# Patient Record
Sex: MALE | Race: WHITE | NOT HISPANIC OR LATINO | Employment: FULL TIME | URBAN - METROPOLITAN AREA
[De-identification: names, ages, dates, MRNs, and addresses within clinical notes are randomized per-mention and may not be internally consistent; named-entity substitution may affect disease eponyms.]

---

## 2021-10-01 ENCOUNTER — APPOINTMENT (OUTPATIENT)
Dept: CT IMAGING | Facility: HOSPITAL | Age: 26
End: 2021-10-01
Attending: EMERGENCY MEDICINE
Payer: COMMERCIAL

## 2021-10-01 ENCOUNTER — HOSPITAL ENCOUNTER (EMERGENCY)
Facility: HOSPITAL | Age: 26
Discharge: HOME | End: 2021-10-01
Attending: EMERGENCY MEDICINE | Admitting: EMERGENCY MEDICINE
Payer: COMMERCIAL

## 2021-10-01 VITALS
HEIGHT: 72 IN | TEMPERATURE: 99.5 F | SYSTOLIC BLOOD PRESSURE: 128 MMHG | RESPIRATION RATE: 16 BRPM | OXYGEN SATURATION: 97 % | WEIGHT: 160 LBS | BODY MASS INDEX: 21.67 KG/M2 | HEART RATE: 77 BPM | DIASTOLIC BLOOD PRESSURE: 76 MMHG

## 2021-10-01 DIAGNOSIS — R10.13 ABDOMINAL PAIN, EPIGASTRIC: ICD-10-CM

## 2021-10-01 DIAGNOSIS — E87.6 HYPOKALEMIA: ICD-10-CM

## 2021-10-01 LAB
ALBUMIN SERPL-MCNC: 4.1 G/DL (ref 3.5–5)
ALP SERPL-CCNC: 59 U/L (ref 45–120)
ALT SERPL W P-5'-P-CCNC: 42 U/L (ref 0–45)
ANION GAP SERPL CALCULATED.3IONS-SCNC: 12 MMOL/L (ref 5–18)
AST SERPL W P-5'-P-CCNC: 83 U/L (ref 0–40)
BASOPHILS # BLD AUTO: 0.1 10E3/UL (ref 0–0.2)
BASOPHILS NFR BLD AUTO: 2 %
BILIRUB DIRECT SERPL-MCNC: 0.4 MG/DL
BILIRUB SERPL-MCNC: 1.2 MG/DL (ref 0–1)
BUN SERPL-MCNC: 6 MG/DL (ref 8–22)
CALCIUM SERPL-MCNC: 9.9 MG/DL (ref 8.5–10.5)
CHLORIDE BLD-SCNC: 98 MMOL/L (ref 98–107)
CO2 SERPL-SCNC: 28 MMOL/L (ref 22–31)
CREAT SERPL-MCNC: 0.71 MG/DL (ref 0.7–1.3)
EOSINOPHIL # BLD AUTO: 0.2 10E3/UL (ref 0–0.7)
EOSINOPHIL NFR BLD AUTO: 4 %
ERYTHROCYTE [DISTWIDTH] IN BLOOD BY AUTOMATED COUNT: 13.3 % (ref 10–15)
ETHANOL SERPL-MCNC: <10 MG/DL
GFR SERPL CREATININE-BSD FRML MDRD: >90 ML/MIN/1.73M2
GLUCOSE BLD-MCNC: 95 MG/DL (ref 70–125)
HCT VFR BLD AUTO: 43.1 % (ref 40–53)
HGB BLD-MCNC: 15 G/DL (ref 13.3–17.7)
HOLD SPECIMEN: NORMAL
IMM GRANULOCYTES # BLD: 0 10E3/UL
IMM GRANULOCYTES NFR BLD: 0 %
LIPASE SERPL-CCNC: 14 U/L (ref 0–52)
LYMPHOCYTES # BLD AUTO: 1.9 10E3/UL (ref 0.8–5.3)
LYMPHOCYTES NFR BLD AUTO: 33 %
MCH RBC QN AUTO: 31.4 PG (ref 26.5–33)
MCHC RBC AUTO-ENTMCNC: 34.8 G/DL (ref 31.5–36.5)
MCV RBC AUTO: 90 FL (ref 78–100)
MONOCYTES # BLD AUTO: 0.7 10E3/UL (ref 0–1.3)
MONOCYTES NFR BLD AUTO: 11 %
NEUTROPHILS # BLD AUTO: 2.9 10E3/UL (ref 1.6–8.3)
NEUTROPHILS NFR BLD AUTO: 50 %
NRBC # BLD AUTO: 0 10E3/UL
NRBC BLD AUTO-RTO: 0 /100
PLATELET # BLD AUTO: 307 10E3/UL (ref 150–450)
POTASSIUM BLD-SCNC: 3.1 MMOL/L (ref 3.5–5)
PROT SERPL-MCNC: 7.5 G/DL (ref 6–8)
RBC # BLD AUTO: 4.77 10E6/UL (ref 4.4–5.9)
SODIUM SERPL-SCNC: 138 MMOL/L (ref 136–145)
WBC # BLD AUTO: 5.8 10E3/UL (ref 4–11)

## 2021-10-01 PROCEDURE — 96361 HYDRATE IV INFUSION ADD-ON: CPT

## 2021-10-01 PROCEDURE — 99285 EMERGENCY DEPT VISIT HI MDM: CPT | Mod: 25

## 2021-10-01 PROCEDURE — 80053 COMPREHEN METABOLIC PANEL: CPT | Performed by: EMERGENCY MEDICINE

## 2021-10-01 PROCEDURE — 83690 ASSAY OF LIPASE: CPT | Performed by: EMERGENCY MEDICINE

## 2021-10-01 PROCEDURE — 82077 ASSAY SPEC XCP UR&BREATH IA: CPT | Performed by: EMERGENCY MEDICINE

## 2021-10-01 PROCEDURE — 250N000011 HC RX IP 250 OP 636: Performed by: EMERGENCY MEDICINE

## 2021-10-01 PROCEDURE — 96374 THER/PROPH/DIAG INJ IV PUSH: CPT | Mod: 59

## 2021-10-01 PROCEDURE — 82248 BILIRUBIN DIRECT: CPT | Performed by: EMERGENCY MEDICINE

## 2021-10-01 PROCEDURE — 258N000003 HC RX IP 258 OP 636: Performed by: EMERGENCY MEDICINE

## 2021-10-01 PROCEDURE — 74177 CT ABD & PELVIS W/CONTRAST: CPT

## 2021-10-01 PROCEDURE — 250N000009 HC RX 250: Performed by: EMERGENCY MEDICINE

## 2021-10-01 PROCEDURE — 85025 COMPLETE CBC W/AUTO DIFF WBC: CPT | Performed by: EMERGENCY MEDICINE

## 2021-10-01 PROCEDURE — 36415 COLL VENOUS BLD VENIPUNCTURE: CPT | Performed by: EMERGENCY MEDICINE

## 2021-10-01 PROCEDURE — 82310 ASSAY OF CALCIUM: CPT | Performed by: EMERGENCY MEDICINE

## 2021-10-01 PROCEDURE — 250N000013 HC RX MED GY IP 250 OP 250 PS 637: Performed by: EMERGENCY MEDICINE

## 2021-10-01 RX ORDER — ONDANSETRON 4 MG/1
4 TABLET, ORALLY DISINTEGRATING ORAL ONCE
Status: COMPLETED | OUTPATIENT
Start: 2021-10-01 | End: 2021-10-01

## 2021-10-01 RX ORDER — IOPAMIDOL 755 MG/ML
100 INJECTION, SOLUTION INTRAVASCULAR ONCE
Status: COMPLETED | OUTPATIENT
Start: 2021-10-01 | End: 2021-10-01

## 2021-10-01 RX ORDER — POTASSIUM CHLORIDE 1500 MG/1
40 TABLET, EXTENDED RELEASE ORAL ONCE
Status: COMPLETED | OUTPATIENT
Start: 2021-10-01 | End: 2021-10-01

## 2021-10-01 RX ADMIN — IOPAMIDOL 100 ML: 755 INJECTION, SOLUTION INTRAVENOUS at 12:22

## 2021-10-01 RX ADMIN — ONDANSETRON 4 MG: 4 TABLET, ORALLY DISINTEGRATING ORAL at 08:45

## 2021-10-01 RX ADMIN — HYDROMORPHONE HYDROCHLORIDE 0.5 MG: 1 INJECTION, SOLUTION INTRAMUSCULAR; INTRAVENOUS; SUBCUTANEOUS at 10:39

## 2021-10-01 RX ADMIN — LIDOCAINE HYDROCHLORIDE 30 ML: 20 SOLUTION ORAL; TOPICAL at 10:39

## 2021-10-01 RX ADMIN — SODIUM CHLORIDE 1000 ML: 9 INJECTION, SOLUTION INTRAVENOUS at 10:38

## 2021-10-01 RX ADMIN — POTASSIUM CHLORIDE 40 MEQ: 20 TABLET, EXTENDED RELEASE ORAL at 13:24

## 2021-10-01 ASSESSMENT — ENCOUNTER SYMPTOMS
ABDOMINAL PAIN: 1
COUGH: 0
FEVER: 0
TROUBLE SWALLOWING: 0
NAUSEA: 0
DYSURIA: 0
SHORTNESS OF BREATH: 0
VOMITING: 0
DIARRHEA: 0

## 2021-10-01 ASSESSMENT — MIFFLIN-ST. JEOR: SCORE: 1748.76

## 2021-10-01 NOTE — ED NOTES
Abdominal pain that is sharp and constant; radiates to back. Has been interfering with sleep, activity and diet. Stools less frequent due to decreased appetite. Bowel sound hypoactive in all quadrants. Pp strong. History of recent endoscopy, patient states he just finished a course of antibiotics, no history of previous surgeries. State's he has eduarda hospitalized recently for this same problem out of state.

## 2021-10-01 NOTE — ED TRIAGE NOTES
Patient had pancreatitis a month ago.  Last night he came back from [place].  The pain started at dinner time last night.  Abdominal pain, similar to pancreatitis as before. Nausea without vomiting.

## 2021-10-01 NOTE — ED PROVIDER NOTES
EMERGENCY DEPARTMENT ENCOUNTER      NAME: Javier Rivas  AGE: 26 year old male  YOB: 1995  MRN: [id number]  EVALUATION DATE & TIME: 10/1/2021 10:01 AM    PCP: [facility]    ED PROVIDER: Pavithra Blunt M.D.        Chief Complaint   Patient presents with     Abdominal Pain         FINAL IMPRESSION:    1. Hypokalemia    2. Abdominal pain, epigastric            MEDICAL DECISION MAKIN year old male with reported recent admission for pancreatitis who presents emergency department with abdominal pain and concerns for recurrent pancreatitis.  Laboratories and CT scan unremarkable no signs of pancreatitis.  No other signs for abdominal pain.  Does not appear toxic or septic.  At this time I feel he can be discharged home.  Slight possibilities could be a gastritis and therefore encourage clear liquid diet at this time.  He agrees with this plan.  Understands what to watch for and when to return to the ER and all of his questions have been answered.      ED COURSE:  10:17 AM I met with the patient to gather history and perform my exam. ED course and treatment discussed.    1:07 PM Discussed with the patient and all questioned fully answered. He will call me if any problems arise.  No signs or pancreatitis on laboratory or CT imaging.  Overall things look good.  Could represent a very mild gastritis as well.  We will have him eat a clear liquid diet try to stay away from acidic foods for a couple of days.  Does not appear toxic.  Overall abdomen very soft and benign.  Patient feels comfortable with this plan.  Understands what to watch for and when to return to the ER.  All of his questions have been answered.    I do not think that this represents ACS, PE, ruptured AAA, aortic dissection, bowel obstruction, bowel ischemia, cholecystitis, pancreatitis, appendicitis, diverticulitis, kidney stone, pyelonephritis, incarcerated or strangulated hernia, testicular torsion, viscus  perforation, perforated GI ulcer, or other such etiologies at this time.    COVID-19 PPE worn during patient evaluation:  Mask: n95 and homemade masks   Eye Protection: goggles   Gown: none  Hair cover: yes  Face shield: none  Patient wearing a mask: yes    At the conclusion of the encounter I discussed the results of all of the tests and the disposition. Their questions were answered. The patient (and any family present) acknowledged understanding and were agreeable with the care plan.          CONSULTANTS:  none        MEDICATIONS GIVEN IN THE EMERGENCY:  Medications   ondansetron (ZOFRAN-ODT) ODT tab 4 mg (4 mg Oral Given 10/1/21 0845)   0.9% sodium chloride BOLUS (0 mLs Intravenous Stopped 10/1/21 1214)   HYDROmorphone (DILAUDID) injection 0.5 mg (0.5 mg Intravenous Given 10/1/21 1039)   lidocaine (XYLOCAINE) 2 % 15 mL, alum & mag hydroxide-simethicone (MAALOX) 15 mL GI Cocktail (30 mLs Oral Given 10/1/21 1039)   iopamidol (ISOVUE-370) solution 100 mL (100 mLs Intravenous Given 10/1/21 1222)   potassium chloride ER (KLOR-CON M) CR tablet 40 mEq (40 mEq Oral Given 10/1/21 1324)           NEW PRESCRIPTIONS STARTED AT TODAY'S ER VISIT     Medication List      There are no discharge medications for this visit.             CONDITION:  stable        DISPOSITION:  discharge home         =================================================================  =================================================================    HPI    Patient information was obtained from: Patient    Use of Intrepreter: N/A     Javier Rivas is a 26 year old male with history of depression who presents to the ER via private car with complaints of abdominal pain.    Patient presents with increasing epigastric and left upper quadrant abdominal pain.  He reports recent hospitalization while in [place] for pancreatitis.  Denies alcohol use or drug use.  States this feels like the pancreatitis.  Otherwise denies any fevers, cough, chest pain,  shortness of breath, vomiting, diarrhea, dysuria, hematuria.      REVIEW OF SYSTEMS  Review of Systems   Constitutional: Negative for fever.   HENT: Negative for trouble swallowing.    Respiratory: Negative for cough and shortness of breath.    Cardiovascular: Negative for chest pain.   Gastrointestinal: Positive for abdominal pain. Negative for diarrhea, nausea and vomiting.   Genitourinary: Negative for dysuria.   Allergic/Immunologic: Negative for immunocompromised state.   All other systems reviewed and are negative.            PAST MEDICAL HISTORY:  Past Medical History:   Diagnosis Date     Allergic state      Anxiety      Depressive disorder          PAST SURGICAL HISTORY:  No past surgical history on file.      CURRENT MEDICATIONS:    Prior to Admission medications    Medication Sig Start Date End Date Taking? Authorizing Provider   divalproex (DEPAKOTE ER) 250 MG 24 hr tablet Take 3 tablets (750 mg) by mouth At Bedtime 10/23/15 11/22/15  Rosendo Arriola MD   fexofenadine-pseudoePHEDrine (ALLEGRA-D 24) 180-240 MG per tablet Take 1 tablet by mouth daily    Unknown, Entered By History   mometasone (NASONEX) 50 MCG/ACT nasal spray Spray 2 sprays into both nostrils daily    Reported, Patient   QUEtiapine (SEROQUEL) 25 MG tablet Take 1-2 tablets (25-50 mg) by mouth every 6 hours as needed (agitation) 10/23/15   Rosendo Arriola MD         ALLERGIES:  No Known Allergies      FAMILY HISTORY:  No family history on file.      SOCIAL HISTORY:  Social History     Socioeconomic History     Marital status: Single     Spouse name: Not on file     Number of children: Not on file     Years of education: Not on file     Highest education level: Not on file   Occupational History     Not on file   Tobacco Use     Smoking status: Never Smoker     Smokeless tobacco: Current User   Substance and Sexual Activity     Alcohol use: Yes     Comment: occ.     Drug use: Yes     Types: Marijuana     Sexual activity: Not on file    Other Topics Concern     Not on file   Social History Narrative     Not on file     Social Determinants of Health     Financial Resource Strain:      Difficulty of Paying Living Expenses:    Food Insecurity:      Worried About Running Out of Food in the Last Year:      Ran Out of Food in the Last Year:    Transportation Needs:      Lack of Transportation (Medical):      Lack of Transportation (Non-Medical):    Physical Activity:      Days of Exercise per Week:      Minutes of Exercise per Session:    Stress:      Feeling of Stress :    Social Connections:      Frequency of Communication with Friends and Family:      Frequency of Social Gatherings with Friends and Family:      Attends Sabianist Services:      Active Member of Clubs or Organizations:      Attends Club or Organization Meetings:      Marital Status:    Intimate Partner Violence:      Fear of Current or Ex-Partner:      Emotionally Abused:      Physically Abused:      Sexually Abused:          VITALS:  Patient Vitals for the past 24 hrs:   BP Temp Temp src Pulse Resp SpO2 Height Weight   10/01/21 1212 130/68 -- -- 63 17 97 % -- --   10/01/21 1011 (!) 151/74 -- -- 62 17 -- -- --   10/01/21 0724 (!) 153/92 99.5  F (37.5  C) Oral 77 18 99 % 1.829 m (6') 72.6 kg (160 lb)       Wt Readings from Last 3 Encounters:   10/01/21 72.6 kg (160 lb)   10/19/15 68.5 kg (151 lb) (44 %, Z= -0.14)*     * Growth percentiles are based on [facility] (Boys, 2-20 Years) data.         PHYSICAL EXAM    Constitutional:  Well developed, Well nourished, NAD, GCS 15  HENT:  Normocephalic, Atraumatic, Bilateral external ears normal, Nose normal. Neck- Normal range of motion, No tenderness, Supple, No stridor.  Eyes:  PERRL, EOMI, Conjunctiva normal, No discharge.  Respiratory:  Normal breath sounds, No respiratory distress, No wheezing, Speaks full sentences easily. No cough.   Cardiovascular:  Normal heart rate, Regular rhythm, No murmurs, No rubs, No gallops.   GI:  No excessive  obesity.  Bowel sounds normal, Soft, mild epigastric and LUQ tenderness, No masses, No flank tenderness. No rebound or guarding.   : deferred  Musculoskeletal: No cyanosis, No clubbing. Good range of motion in all major joints. No major deformities noted.   Integument:  Warm, Dry, No erythema, No rash.  No petechiae.   Neurologic:  Alert & oriented x 3, No focal deficits noted.   Psychiatric:  Affect normal, Cooperative          LAB:  All pertinent labs reviewed and interpreted.  Recent Results (from the past 24 hour(s))   Basic metabolic panel    Collection Time: 10/01/21  8:39 AM   Result Value Ref Range    Sodium 138 136 - 145 mmol/L    Potassium 3.1 (L) 3.5 - 5.0 mmol/L    Chloride 98 98 - 107 mmol/L    Carbon Dioxide (CO2) 28 22 - 31 mmol/L    Anion Gap 12 5 - 18 mmol/L    Urea Nitrogen 6 (L) 8 - 22 mg/dL    Creatinine 0.71 0.70 - 1.30 mg/dL    Calcium 9.9 8.5 - 10.5 mg/dL    Glucose 95 70 - 125 mg/dL    GFR Estimate >90 >60 mL/min/1.73m2   Hepatic function panel    Collection Time: 10/01/21  8:39 AM   Result Value Ref Range    Bilirubin Total 1.2 (H) 0.0 - 1.0 mg/dL    Bilirubin Direct 0.4 <=0.5 mg/dL    Protein Total 7.5 6.0 - 8.0 g/dL    Albumin 4.1 3.5 - 5.0 g/dL    Alkaline Phosphatase 59 45 - 120 U/L    AST 83 (H) 0 - 40 U/L    ALT 42 0 - 45 U/L   Lipase    Collection Time: 10/01/21  8:39 AM   Result Value Ref Range    Lipase 14 0 - 52 U/L   Ethyl Alcohol Level    Collection Time: 10/01/21  8:39 AM   Result Value Ref Range    Alcohol, Blood <10 None detected mg/dL   CBC with platelets and differential    Collection Time: 10/01/21  8:39 AM   Result Value Ref Range    WBC Count 5.8 4.0 - 11.0 10e3/uL    RBC Count 4.77 4.40 - 5.90 10e6/uL    Hemoglobin 15.0 13.3 - 17.7 g/dL    Hematocrit 43.1 40.0 - 53.0 %    MCV 90 78 - 100 fL    MCH 31.4 26.5 - 33.0 pg    MCHC 34.8 31.5 - 36.5 g/dL    RDW 13.3 10.0 - 15.0 %    Platelet Count 307 150 - 450 10e3/uL    % Neutrophils 50 %    % Lymphocytes 33 %    % Monocytes  11 %    % Eosinophils 4 %    % Basophils 2 %    % Immature Granulocytes 0 %    NRBCs per 100 WBC 0 <1 /100    Absolute Neutrophils 2.9 1.6 - 8.3 10e3/uL    Absolute Lymphocytes 1.9 0.8 - 5.3 10e3/uL    Absolute Monocytes 0.7 0.0 - 1.3 10e3/uL    Absolute Eosinophils 0.2 0.0 - 0.7 10e3/uL    Absolute Basophils 0.1 0.0 - 0.2 10e3/uL    Absolute Immature Granulocytes 0.0 <=0.0 10e3/uL    Absolute NRBCs 0.0 10e3/uL   Extra Blue Top Tube    Collection Time: 10/01/21  8:39 AM   Result Value Ref Range    Hold Specimen JIC        No results found for: ABORH        RADIOLOGY:  Reviewed all pertinent imaging. Please see official radiology report.    CT Abdomen Pelvis w Contrast   Final Result   IMPRESSION:    1.  Hepatic steatosis.   2.  Nonobstructing right renal calculus.             EKG:    none      PROCEDURES:  none      I, Marta Chou, am serving as a scribe to document services personally performed by Dr. Pavithra Blunt based on my observation and the provider's statements to me. I, Dr. Pavithra Blunt MD attest that Marta Chou is acting in a scribe capacity, has observed my performance of the services and has documented them in accordance with my direction.        Pavithra Blunt M.D. [facility]  Emergency Medicine and Medical Toxicology  Formerly [facility] EMERGENCY DEPARTMENT  [address] [place] [id number] [phone]  Dept: [phone]           Pavithra Blunt MD  10/01/21 6541

## 2021-10-01 NOTE — DISCHARGE INSTRUCTIONS
No signs of pancreatitis on either your blood work or the CT scan.  Overall things look very good.  I recommend no doing a clear liquid diet for the next couple of days to see if this helps.  Clear liquid diet recommends things like fruit juice, but I would avoid fruit juice at this time is to help limit acidic foods as well is there is a possibility discussed represent some gastritis or stomach irritation.    Make an appointment to see family doctor for next week if your symptoms are not improving.    Return to emergency department with worse pain, black or bloody stools, fever, or any other concerns.    Thank you for choosing [facility].  It has been my pleasure caring for you today.     ~Dr. Merlene MD

## 2022-01-21 ENCOUNTER — HOSPITAL ENCOUNTER (INPATIENT)
Facility: HOSPITAL | Age: 27
LOS: 6 days | Discharge: HOME | DRG: 439 | End: 2022-01-28
Attending: EMERGENCY MEDICINE | Admitting: STUDENT IN AN ORGANIZED HEALTH CARE EDUCATION/TRAINING PROGRAM
Payer: MEDICAID

## 2022-01-21 ENCOUNTER — APPOINTMENT (OUTPATIENT)
Dept: CT IMAGING | Facility: HOSPITAL | Age: 27
DRG: 439 | End: 2022-01-21
Payer: MEDICAID

## 2022-01-21 DIAGNOSIS — K85.90 ACUTE PANCREATITIS: Primary | ICD-10-CM

## 2022-01-21 DIAGNOSIS — F10.220 ALCOHOL DEPENDENCE WITH UNCOMPLICATED INTOXICATION (H): ICD-10-CM

## 2022-01-21 DIAGNOSIS — K29.80 DUODENITIS: ICD-10-CM

## 2022-01-21 DIAGNOSIS — F10.930 ALCOHOL WITHDRAWAL SYNDROME WITHOUT COMPLICATION (H): ICD-10-CM

## 2022-01-21 DIAGNOSIS — K85.20 ALCOHOL-INDUCED ACUTE PANCREATITIS WITHOUT INFECTION OR NECROSIS: ICD-10-CM

## 2022-01-21 DIAGNOSIS — R10.12 LUQ ABDOMINAL PAIN: ICD-10-CM

## 2022-01-21 DIAGNOSIS — R11.0 NAUSEA: ICD-10-CM

## 2022-01-21 LAB
ALBUMIN SERPL-MCNC: 4.6 G/DL (ref 3.5–5)
ALBUMIN UR-MCNC: 10 MG/DL
ALP SERPL-CCNC: 58 U/L (ref 45–120)
ALT SERPL W P-5'-P-CCNC: 39 U/L (ref 0–45)
ANION GAP SERPL CALCULATED.3IONS-SCNC: 15 MMOL/L (ref 5–18)
APPEARANCE UR: CLEAR
AST SERPL W P-5'-P-CCNC: 39 U/L (ref 0–40)
BASOPHILS # BLD AUTO: 0.1 10E3/UL (ref 0–0.2)
BASOPHILS NFR BLD AUTO: 1 %
BILIRUB DIRECT SERPL-MCNC: 0.1 MG/DL
BILIRUB SERPL-MCNC: 0.4 MG/DL (ref 0–1)
BILIRUB UR QL STRIP: NEGATIVE
BUN SERPL-MCNC: 11 MG/DL (ref 8–22)
CALCIUM SERPL-MCNC: 9.2 MG/DL (ref 8.5–10.5)
CHLORIDE BLD-SCNC: 104 MMOL/L (ref 98–107)
CO2 SERPL-SCNC: 25 MMOL/L (ref 22–31)
COLOR UR AUTO: ABNORMAL
CREAT SERPL-MCNC: 0.8 MG/DL (ref 0.7–1.3)
EOSINOPHIL # BLD AUTO: 0 10E3/UL (ref 0–0.7)
EOSINOPHIL NFR BLD AUTO: 1 %
ERYTHROCYTE [DISTWIDTH] IN BLOOD BY AUTOMATED COUNT: 13.4 % (ref 10–15)
ETHANOL SERPL-MCNC: 363 MG/DL
GFR SERPL CREATININE-BSD FRML MDRD: >90 ML/MIN/1.73M2
GLUCOSE BLD-MCNC: 94 MG/DL (ref 70–125)
GLUCOSE UR STRIP-MCNC: NEGATIVE MG/DL
HCT VFR BLD AUTO: 47.3 % (ref 40–53)
HGB BLD-MCNC: 15.9 G/DL (ref 13.3–17.7)
HGB UR QL STRIP: NEGATIVE
IMM GRANULOCYTES # BLD: 0 10E3/UL
IMM GRANULOCYTES NFR BLD: 0 %
KETONES UR STRIP-MCNC: NEGATIVE MG/DL
LEUKOCYTE ESTERASE UR QL STRIP: NEGATIVE
LIPASE SERPL-CCNC: 70 U/L (ref 0–52)
LYMPHOCYTES # BLD AUTO: 2.5 10E3/UL (ref 0.8–5.3)
LYMPHOCYTES NFR BLD AUTO: 37 %
MCH RBC QN AUTO: 30.1 PG (ref 26.5–33)
MCHC RBC AUTO-ENTMCNC: 33.6 G/DL (ref 31.5–36.5)
MCV RBC AUTO: 90 FL (ref 78–100)
MONOCYTES # BLD AUTO: 0.6 10E3/UL (ref 0–1.3)
MONOCYTES NFR BLD AUTO: 9 %
MUCOUS THREADS #/AREA URNS LPF: PRESENT /LPF
NEUTROPHILS # BLD AUTO: 3.6 10E3/UL (ref 1.6–8.3)
NEUTROPHILS NFR BLD AUTO: 52 %
NITRATE UR QL: NEGATIVE
NRBC # BLD AUTO: 0 10E3/UL
NRBC BLD AUTO-RTO: 0 /100
PH UR STRIP: 6 [PH] (ref 5–7)
PLATELET # BLD AUTO: 289 10E3/UL (ref 150–450)
POTASSIUM BLD-SCNC: 3.9 MMOL/L (ref 3.5–5)
PROT SERPL-MCNC: 8.2 G/DL (ref 6–8)
RBC # BLD AUTO: 5.28 10E6/UL (ref 4.4–5.9)
RBC URINE: 0 /HPF
SARS-COV-2 RNA RESP QL NAA+PROBE: NEGATIVE
SODIUM SERPL-SCNC: 144 MMOL/L (ref 136–145)
SP GR UR STRIP: 1.02 (ref 1–1.03)
TROPONIN I SERPL-MCNC: <0.01 NG/ML (ref 0–0.29)
UROBILINOGEN UR STRIP-MCNC: <2 MG/DL
WBC # BLD AUTO: 6.8 10E3/UL (ref 4–11)
WBC URINE: 0 /HPF

## 2022-01-21 PROCEDURE — 82248 BILIRUBIN DIRECT: CPT | Performed by: EMERGENCY MEDICINE

## 2022-01-21 PROCEDURE — 96375 TX/PRO/DX INJ NEW DRUG ADDON: CPT

## 2022-01-21 PROCEDURE — 250N000011 HC RX IP 250 OP 636: Performed by: PHYSICIAN ASSISTANT

## 2022-01-21 PROCEDURE — 83735 ASSAY OF MAGNESIUM: CPT | Performed by: PHYSICIAN ASSISTANT

## 2022-01-21 PROCEDURE — 99285 EMERGENCY DEPT VISIT HI MDM: CPT | Mod: 25

## 2022-01-21 PROCEDURE — 36415 COLL VENOUS BLD VENIPUNCTURE: CPT | Performed by: PHYSICIAN ASSISTANT

## 2022-01-21 PROCEDURE — 87635 SARS-COV-2 COVID-19 AMP PRB: CPT | Performed by: PHYSICIAN ASSISTANT

## 2022-01-21 PROCEDURE — 81001 URINALYSIS AUTO W/SCOPE: CPT | Performed by: PHYSICIAN ASSISTANT

## 2022-01-21 PROCEDURE — 74177 CT ABD & PELVIS W/CONTRAST: CPT

## 2022-01-21 PROCEDURE — C9113 INJ PANTOPRAZOLE SODIUM, VIA: HCPCS | Performed by: PHYSICIAN ASSISTANT

## 2022-01-21 PROCEDURE — C9803 HOPD COVID-19 SPEC COLLECT: HCPCS

## 2022-01-21 PROCEDURE — 82077 ASSAY SPEC XCP UR&BREATH IA: CPT | Performed by: PHYSICIAN ASSISTANT

## 2022-01-21 PROCEDURE — 96361 HYDRATE IV INFUSION ADD-ON: CPT

## 2022-01-21 PROCEDURE — 84484 ASSAY OF TROPONIN QUANT: CPT | Performed by: PHYSICIAN ASSISTANT

## 2022-01-21 PROCEDURE — 82248 BILIRUBIN DIRECT: CPT | Performed by: STUDENT IN AN ORGANIZED HEALTH CARE EDUCATION/TRAINING PROGRAM

## 2022-01-21 PROCEDURE — 258N000003 HC RX IP 258 OP 636: Performed by: PHYSICIAN ASSISTANT

## 2022-01-21 PROCEDURE — 85025 COMPLETE CBC W/AUTO DIFF WBC: CPT | Performed by: EMERGENCY MEDICINE

## 2022-01-21 PROCEDURE — 84100 ASSAY OF PHOSPHORUS: CPT | Performed by: PHYSICIAN ASSISTANT

## 2022-01-21 PROCEDURE — 83690 ASSAY OF LIPASE: CPT | Performed by: STUDENT IN AN ORGANIZED HEALTH CARE EDUCATION/TRAINING PROGRAM

## 2022-01-21 PROCEDURE — 80053 COMPREHEN METABOLIC PANEL: CPT | Performed by: EMERGENCY MEDICINE

## 2022-01-21 PROCEDURE — 85025 COMPLETE CBC W/AUTO DIFF WBC: CPT | Performed by: STUDENT IN AN ORGANIZED HEALTH CARE EDUCATION/TRAINING PROGRAM

## 2022-01-21 PROCEDURE — 83690 ASSAY OF LIPASE: CPT | Performed by: EMERGENCY MEDICINE

## 2022-01-21 PROCEDURE — 93005 ELECTROCARDIOGRAM TRACING: CPT | Performed by: PHYSICIAN ASSISTANT

## 2022-01-21 PROCEDURE — 80053 COMPREHEN METABOLIC PANEL: CPT | Performed by: STUDENT IN AN ORGANIZED HEALTH CARE EDUCATION/TRAINING PROGRAM

## 2022-01-21 RX ORDER — IOPAMIDOL 755 MG/ML
100 INJECTION, SOLUTION INTRAVASCULAR ONCE
Status: COMPLETED | OUTPATIENT
Start: 2022-01-21 | End: 2022-01-21

## 2022-01-21 RX ORDER — HYDROMORPHONE HCL IN WATER/PF 6 MG/30 ML
0.5 PATIENT CONTROLLED ANALGESIA SYRINGE INTRAVENOUS ONCE
Status: COMPLETED | OUTPATIENT
Start: 2022-01-21 | End: 2022-01-21

## 2022-01-21 RX ORDER — ONDANSETRON 2 MG/ML
4 INJECTION INTRAMUSCULAR; INTRAVENOUS ONCE
Status: COMPLETED | OUTPATIENT
Start: 2022-01-21 | End: 2022-01-21

## 2022-01-21 RX ORDER — HYDROMORPHONE HCL IN WATER/PF 6 MG/30 ML
0.5 PATIENT CONTROLLED ANALGESIA SYRINGE INTRAVENOUS
Status: COMPLETED | OUTPATIENT
Start: 2022-01-21 | End: 2022-01-22

## 2022-01-21 RX ADMIN — HYDROMORPHONE HYDROCHLORIDE 0.5 MG: 0.2 INJECTION, SOLUTION INTRAMUSCULAR; INTRAVENOUS; SUBCUTANEOUS at 22:15

## 2022-01-21 RX ADMIN — ONDANSETRON 4 MG: 2 INJECTION INTRAMUSCULAR; INTRAVENOUS at 22:13

## 2022-01-21 RX ADMIN — IOPAMIDOL 100 ML: 755 INJECTION, SOLUTION INTRAVENOUS at 22:46

## 2022-01-21 RX ADMIN — PANTOPRAZOLE SODIUM 40 MG: 40 INJECTION, POWDER, FOR SOLUTION INTRAVENOUS at 22:19

## 2022-01-21 RX ADMIN — SODIUM CHLORIDE 1000 ML: 9 INJECTION, SOLUTION INTRAVENOUS at 22:11

## 2022-01-21 ASSESSMENT — ENCOUNTER SYMPTOMS
CHILLS: 0
BACK PAIN: 1
BLOOD IN STOOL: 0
ABDOMINAL PAIN: 1
FEVER: 0

## 2022-01-21 ASSESSMENT — MIFFLIN-ST. JEOR: SCORE: 1748.76

## 2022-01-22 PROBLEM — K85.90 ACUTE PANCREATITIS: Status: ACTIVE | Noted: 2022-01-22

## 2022-01-22 PROBLEM — F10.220 ALCOHOL DEPENDENCE WITH UNCOMPLICATED INTOXICATION (H): Status: ACTIVE | Noted: 2022-01-22

## 2022-01-22 PROBLEM — R10.12 LUQ ABDOMINAL PAIN: Status: ACTIVE | Noted: 2022-01-22

## 2022-01-22 PROBLEM — R11.0 NAUSEA: Status: ACTIVE | Noted: 2022-01-22

## 2022-01-22 PROBLEM — K85.90 PANCREATITIS: Status: ACTIVE | Noted: 2022-01-22

## 2022-01-22 PROBLEM — K29.80 DUODENITIS: Status: ACTIVE | Noted: 2022-01-22

## 2022-01-22 LAB
ATRIAL RATE - MUSE: 107 BPM
DIASTOLIC BLOOD PRESSURE - MUSE: NORMAL MMHG
INTERPRETATION ECG - MUSE: NORMAL
MAGNESIUM SERPL-MCNC: 1.9 MG/DL (ref 1.8–2.6)
P AXIS - MUSE: 89 DEGREES
PHOSPHATE SERPL-MCNC: 3.3 MG/DL (ref 2.5–4.5)
PR INTERVAL - MUSE: 140 MS
QRS DURATION - MUSE: 88 MS
QT - MUSE: 332 MS
QTC - MUSE: 443 MS
R AXIS - MUSE: 67 DEGREES
SYSTOLIC BLOOD PRESSURE - MUSE: NORMAL MMHG
T AXIS - MUSE: 78 DEGREES
VENTRICULAR RATE- MUSE: 107 BPM

## 2022-01-22 PROCEDURE — 96376 TX/PRO/DX INJ SAME DRUG ADON: CPT

## 2022-01-22 PROCEDURE — 258N000003 HC RX IP 258 OP 636: Performed by: HOSPITALIST

## 2022-01-22 PROCEDURE — 120N000001 HC R&B MED SURG/OB

## 2022-01-22 PROCEDURE — 250N000009 HC RX 250: Performed by: PHYSICIAN ASSISTANT

## 2022-01-22 PROCEDURE — 250N000011 HC RX IP 250 OP 636: Performed by: PHYSICIAN ASSISTANT

## 2022-01-22 PROCEDURE — C9113 INJ PANTOPRAZOLE SODIUM, VIA: HCPCS | Performed by: STUDENT IN AN ORGANIZED HEALTH CARE EDUCATION/TRAINING PROGRAM

## 2022-01-22 PROCEDURE — G0378 HOSPITAL OBSERVATION PER HR: HCPCS

## 2022-01-22 PROCEDURE — C9113 INJ PANTOPRAZOLE SODIUM, VIA: HCPCS | Performed by: PHYSICIAN ASSISTANT

## 2022-01-22 PROCEDURE — HZ2ZZZZ DETOXIFICATION SERVICES FOR SUBSTANCE ABUSE TREATMENT: ICD-10-PCS | Performed by: STUDENT IN AN ORGANIZED HEALTH CARE EDUCATION/TRAINING PROGRAM

## 2022-01-22 PROCEDURE — 96366 THER/PROPH/DIAG IV INF ADDON: CPT

## 2022-01-22 PROCEDURE — 250N000011 HC RX IP 250 OP 636: Performed by: STUDENT IN AN ORGANIZED HEALTH CARE EDUCATION/TRAINING PROGRAM

## 2022-01-22 PROCEDURE — 258N000003 HC RX IP 258 OP 636: Performed by: PHYSICIAN ASSISTANT

## 2022-01-22 PROCEDURE — 99207 PR APP CREDIT; MD BILLING SHARED VISIT: CPT | Performed by: HOSPITALIST

## 2022-01-22 PROCEDURE — 258N000003 HC RX IP 258 OP 636: Performed by: EMERGENCY MEDICINE

## 2022-01-22 PROCEDURE — 250N000013 HC RX MED GY IP 250 OP 250 PS 637: Performed by: STUDENT IN AN ORGANIZED HEALTH CARE EDUCATION/TRAINING PROGRAM

## 2022-01-22 PROCEDURE — 96361 HYDRATE IV INFUSION ADD-ON: CPT

## 2022-01-22 PROCEDURE — 96365 THER/PROPH/DIAG IV INF INIT: CPT | Mod: 59

## 2022-01-22 PROCEDURE — 250N000011 HC RX IP 250 OP 636: Performed by: INTERNAL MEDICINE

## 2022-01-22 PROCEDURE — 250N000011 HC RX IP 250 OP 636: Performed by: HOSPITALIST

## 2022-01-22 PROCEDURE — 99219 PR INITIAL OBSERVATION CARE,LEVEL II: CPT | Performed by: STUDENT IN AN ORGANIZED HEALTH CARE EDUCATION/TRAINING PROGRAM

## 2022-01-22 PROCEDURE — 258N000003 HC RX IP 258 OP 636: Performed by: STUDENT IN AN ORGANIZED HEALTH CARE EDUCATION/TRAINING PROGRAM

## 2022-01-22 PROCEDURE — 250N000013 HC RX MED GY IP 250 OP 250 PS 637: Performed by: PHYSICIAN ASSISTANT

## 2022-01-22 RX ORDER — HALOPERIDOL 5 MG/ML
2.5-5 INJECTION INTRAMUSCULAR EVERY 6 HOURS PRN
Status: DISCONTINUED | OUTPATIENT
Start: 2022-01-22 | End: 2022-01-28 | Stop reason: HOSPADM

## 2022-01-22 RX ORDER — ONDANSETRON 4 MG/1
4 TABLET, ORALLY DISINTEGRATING ORAL EVERY 8 HOURS PRN
COMMUNITY
Start: 2021-09-21

## 2022-01-22 RX ORDER — TRAZODONE HYDROCHLORIDE 50 MG/1
50 TABLET, FILM COATED ORAL
COMMUNITY
Start: 2021-11-30

## 2022-01-22 RX ORDER — NALOXONE HYDROCHLORIDE 0.4 MG/ML
0.2 INJECTION, SOLUTION INTRAMUSCULAR; INTRAVENOUS; SUBCUTANEOUS
Status: DISCONTINUED | OUTPATIENT
Start: 2022-01-22 | End: 2022-01-28 | Stop reason: HOSPADM

## 2022-01-22 RX ORDER — FLUMAZENIL 0.1 MG/ML
0.2 INJECTION, SOLUTION INTRAVENOUS
Status: DISCONTINUED | OUTPATIENT
Start: 2022-01-22 | End: 2022-01-28 | Stop reason: HOSPADM

## 2022-01-22 RX ORDER — HYDROXYZINE HYDROCHLORIDE 25 MG/1
25 TABLET, FILM COATED ORAL 4 TIMES DAILY PRN
COMMUNITY
Start: 2021-11-30

## 2022-01-22 RX ORDER — CLONIDINE HYDROCHLORIDE 0.1 MG/1
0.1 TABLET ORAL 2 TIMES DAILY PRN
COMMUNITY
Start: 2021-11-30

## 2022-01-22 RX ORDER — CLONIDINE HYDROCHLORIDE 0.1 MG/1
0.1 TABLET ORAL EVERY 8 HOURS
Status: DISCONTINUED | OUTPATIENT
Start: 2022-01-22 | End: 2022-01-28 | Stop reason: HOSPADM

## 2022-01-22 RX ORDER — ACETAMINOPHEN 325 MG/1
650 TABLET ORAL EVERY 6 HOURS PRN
Status: DISCONTINUED | OUTPATIENT
Start: 2022-01-22 | End: 2022-01-28 | Stop reason: HOSPADM

## 2022-01-22 RX ORDER — DIAZEPAM 10 MG
10 TABLET ORAL EVERY 30 MIN PRN
Status: DISCONTINUED | OUTPATIENT
Start: 2022-01-22 | End: 2022-01-25

## 2022-01-22 RX ORDER — HYDROMORPHONE HCL IN WATER/PF 6 MG/30 ML
0.5 PATIENT CONTROLLED ANALGESIA SYRINGE INTRAVENOUS EVERY 4 HOURS PRN
Status: DISCONTINUED | OUTPATIENT
Start: 2022-01-22 | End: 2022-01-22

## 2022-01-22 RX ORDER — HYDROMORPHONE HCL IN WATER/PF 6 MG/30 ML
0.5 PATIENT CONTROLLED ANALGESIA SYRINGE INTRAVENOUS
Status: DISCONTINUED | OUTPATIENT
Start: 2022-01-22 | End: 2022-01-23

## 2022-01-22 RX ORDER — ONDANSETRON 4 MG/1
4 TABLET, ORALLY DISINTEGRATING ORAL EVERY 6 HOURS PRN
Status: DISCONTINUED | OUTPATIENT
Start: 2022-01-22 | End: 2022-01-27

## 2022-01-22 RX ORDER — OLANZAPINE 5 MG/1
5-10 TABLET, ORALLY DISINTEGRATING ORAL EVERY 6 HOURS PRN
Status: DISCONTINUED | OUTPATIENT
Start: 2022-01-22 | End: 2022-01-28 | Stop reason: HOSPADM

## 2022-01-22 RX ORDER — NICOTINE 21 MG/24HR
1 PATCH, TRANSDERMAL 24 HOURS TRANSDERMAL DAILY
Status: DISCONTINUED | OUTPATIENT
Start: 2022-01-22 | End: 2022-01-28 | Stop reason: HOSPADM

## 2022-01-22 RX ORDER — SODIUM CHLORIDE, SODIUM LACTATE, POTASSIUM CHLORIDE, CALCIUM CHLORIDE 600; 310; 30; 20 MG/100ML; MG/100ML; MG/100ML; MG/100ML
INJECTION, SOLUTION INTRAVENOUS ONCE
Status: DISCONTINUED | OUTPATIENT
Start: 2022-01-22 | End: 2022-01-22

## 2022-01-22 RX ORDER — ONDANSETRON 2 MG/ML
4 INJECTION INTRAMUSCULAR; INTRAVENOUS EVERY 6 HOURS PRN
Status: DISCONTINUED | OUTPATIENT
Start: 2022-01-22 | End: 2022-01-27

## 2022-01-22 RX ORDER — NALOXONE HYDROCHLORIDE 0.4 MG/ML
0.4 INJECTION, SOLUTION INTRAMUSCULAR; INTRAVENOUS; SUBCUTANEOUS
Status: DISCONTINUED | OUTPATIENT
Start: 2022-01-22 | End: 2022-01-28 | Stop reason: HOSPADM

## 2022-01-22 RX ORDER — DIAZEPAM 10 MG/2ML
5-10 INJECTION, SOLUTION INTRAMUSCULAR; INTRAVENOUS EVERY 30 MIN PRN
Status: DISCONTINUED | OUTPATIENT
Start: 2022-01-22 | End: 2022-01-25

## 2022-01-22 RX ORDER — PANTOPRAZOLE SODIUM 40 MG/1
40 TABLET, DELAYED RELEASE ORAL DAILY
COMMUNITY
Start: 2021-11-30

## 2022-01-22 RX ORDER — SODIUM CHLORIDE 9 MG/ML
INJECTION, SOLUTION INTRAVENOUS CONTINUOUS
Status: DISCONTINUED | OUTPATIENT
Start: 2022-01-22 | End: 2022-01-25

## 2022-01-22 RX ORDER — MULTIPLE VITAMINS W/ MINERALS TAB 9MG-400MCG
1 TAB ORAL DAILY
Status: DISCONTINUED | OUTPATIENT
Start: 2022-01-23 | End: 2022-01-28 | Stop reason: HOSPADM

## 2022-01-22 RX ORDER — ACETAMINOPHEN 650 MG/1
650 SUPPOSITORY RECTAL EVERY 6 HOURS PRN
Status: DISCONTINUED | OUTPATIENT
Start: 2022-01-22 | End: 2022-01-28 | Stop reason: HOSPADM

## 2022-01-22 RX ORDER — FOLIC ACID 1 MG/1
1 TABLET ORAL DAILY
Status: DISCONTINUED | OUTPATIENT
Start: 2022-01-23 | End: 2022-01-28 | Stop reason: HOSPADM

## 2022-01-22 RX ORDER — ZOLPIDEM TARTRATE 10 MG/1
10 TABLET ORAL
COMMUNITY
Start: 2021-12-29

## 2022-01-22 RX ADMIN — THIAMINE HYDROCHLORIDE: 100 INJECTION, SOLUTION INTRAMUSCULAR; INTRAVENOUS at 01:34

## 2022-01-22 RX ADMIN — CLONIDINE HYDROCHLORIDE 0.1 MG: 0.1 TABLET ORAL at 17:11

## 2022-01-22 RX ADMIN — DIAZEPAM 10 MG: 10 TABLET ORAL at 06:15

## 2022-01-22 RX ADMIN — HYDROMORPHONE HYDROCHLORIDE 0.5 MG: 0.2 INJECTION, SOLUTION INTRAMUSCULAR; INTRAVENOUS; SUBCUTANEOUS at 22:21

## 2022-01-22 RX ADMIN — DIAZEPAM 10 MG: 10 TABLET ORAL at 17:11

## 2022-01-22 RX ADMIN — HYDROMORPHONE HYDROCHLORIDE 0.5 MG: 0.2 INJECTION, SOLUTION INTRAMUSCULAR; INTRAVENOUS; SUBCUTANEOUS at 08:45

## 2022-01-22 RX ADMIN — HYDROMORPHONE HYDROCHLORIDE 1 MG: 1 INJECTION, SOLUTION INTRAMUSCULAR; INTRAVENOUS; SUBCUTANEOUS at 16:17

## 2022-01-22 RX ADMIN — PANTOPRAZOLE SODIUM 40 MG: 40 INJECTION, POWDER, FOR SOLUTION INTRAVENOUS at 00:11

## 2022-01-22 RX ADMIN — DIAZEPAM 10 MG: 10 TABLET ORAL at 21:00

## 2022-01-22 RX ADMIN — HYDROMORPHONE HYDROCHLORIDE 1 MG: 1 INJECTION, SOLUTION INTRAMUSCULAR; INTRAVENOUS; SUBCUTANEOUS at 11:59

## 2022-01-22 RX ADMIN — HYDROMORPHONE HYDROCHLORIDE 0.5 MG: 0.2 INJECTION, SOLUTION INTRAMUSCULAR; INTRAVENOUS; SUBCUTANEOUS at 04:14

## 2022-01-22 RX ADMIN — SODIUM CHLORIDE, POTASSIUM CHLORIDE, SODIUM LACTATE AND CALCIUM CHLORIDE 1000 ML: 600; 310; 30; 20 INJECTION, SOLUTION INTRAVENOUS at 02:27

## 2022-01-22 RX ADMIN — PANTOPRAZOLE SODIUM 40 MG: 40 INJECTION, POWDER, FOR SOLUTION INTRAVENOUS at 08:28

## 2022-01-22 RX ADMIN — HYDROMORPHONE HYDROCHLORIDE 0.5 MG: 0.2 INJECTION, SOLUTION INTRAMUSCULAR; INTRAVENOUS; SUBCUTANEOUS at 00:21

## 2022-01-22 RX ADMIN — NICOTINE 1 PATCH: 14 PATCH, EXTENDED RELEASE TRANSDERMAL at 08:48

## 2022-01-22 RX ADMIN — HYDROMORPHONE HYDROCHLORIDE 0.5 MG: 0.2 INJECTION, SOLUTION INTRAMUSCULAR; INTRAVENOUS; SUBCUTANEOUS at 02:22

## 2022-01-22 RX ADMIN — Medication: at 04:18

## 2022-01-22 RX ADMIN — DIAZEPAM 10 MG: 10 TABLET ORAL at 09:41

## 2022-01-22 RX ADMIN — HYDROMORPHONE HYDROCHLORIDE 0.5 MG: 0.2 INJECTION, SOLUTION INTRAMUSCULAR; INTRAVENOUS; SUBCUTANEOUS at 19:43

## 2022-01-22 RX ADMIN — CLONIDINE HYDROCHLORIDE 0.1 MG: 0.1 TABLET ORAL at 08:28

## 2022-01-22 ASSESSMENT — ACTIVITIES OF DAILY LIVING (ADL)
ADLS_ACUITY_SCORE: 12
DEPENDENT_IADLS:: TRANSPORTATION
ADLS_ACUITY_SCORE: 12

## 2022-01-22 ASSESSMENT — MIFFLIN-ST. JEOR: SCORE: 1776.88

## 2022-01-22 NOTE — PROGRESS NOTES
Hospitalist Progress Note    Assessment/Plan    Active Problems:    Acute pancreatitis    Nausea    Pancreatitis    Duodenitis    Alcohol dependence with uncomplicated intoxication (H)    LUQ abdominal pain  26-year-old patient with history of polysubstance abuse (alcohol, cannabis). History of bipolar and panic disorder, presented to the hospital with the left upper quadrant pain after drinking alcohol found to have acute pancreatitis    Abdominal pain due to acute pancreatitis  Admitted to the hospital on January 21, CT abdomen showed edema at the pancreatic head adjacent duodenitis cannot be excluded  Slightly elevated lipase at 70,  Significant amount of pain and requested to increase the frequency of pain medications, denying nausea and vomiting,  -IV fluids, PPI, n.p.o. for now  Had diarrhea yesterday-  Appreciate GI input,    Alcohol intoxication  Alcohol level was 363 on arrival at risk of withdrawal,  CIWA protocol initiated, will monitor      Renal calculus  Incidental finding on CT abdomen nonobstructive right renal calculus,  Follow-up as an outpatient    Tobacco abuse  Nicotine patch ordered    Barriers to Discharge: Monitor for alcohol withdrawal, pancreatitis,    Anticipated discharge date/Disposition: Home in a few days    Subjective  Patient was seen today he was having significant amount of pain and requesting to increase pain medication dose or frequency, denies nausea or vomiting, he states this is not the first time he has pancreatitis but this is the worst pain ever he has    Objective    Vital signs in last 24 hours  Temp:  [98  F (36.7  C)] 98  F (36.7  C)  Pulse:  [] 96  Resp:  [16-22] 20  BP: (122-151)/(58-96) 129/72  SpO2:  [88 %-98 %] 95 % [unfilled] O2 Device: None (Room air)    Weight:   [unfilled] Weight change:     Intake/Output last 3 shifts  No intake/output data recorded.  Body mass index is 21.7 kg/m .    Physical Exam:  General Appearance: Alert, oriented x3, does not  appear in distress.  HEENT: Normocephalic. No scleral icterus, . Mucous membranes moist.  Heart: :Regular rate and rhythm, normal S1 ,S2, No murmurs, no JVD, no pedal edema   Lungs: Clear to auscultation bilaterally. No wheezing or crackles  Abdomen: Soft, left upper quadrant tender, no rebound or rigidity, non distended, bowel sounds present.  Extremity: No deformity. No joint swelling.      Pertinent Labs   Lab Results: personally reviewed.   Recent Labs   Lab 01/21/22 2141      CO2 25   BUN 11   ALBUMIN 4.6   ALKPHOS 58   ALT 39   AST 39     Recent Labs   Lab 01/21/22 2141   WBC 6.8   HGB 15.9   HCT 47.3        Recent Labs   Lab 01/21/22 2141   TROPONINI <0.01     Invalid input(s): POCGLUFGR          Pertinent Radiology   Radiology Results: Reviewed CT results      Advanced Care Planning:  Discharge Planning discussed with patient  Total time with this patient is 25 min with 50% of time spent in examining the patient, reviewing records, discussing plan of care and counseling, 50% of time spent in coordination of care.  Care discussed and coordinated with JO ANN.      Heather López MD  Internal Medicine Hospitalist  1/22/2022

## 2022-01-22 NOTE — ED NOTES
Pt frequently requesting pain meds. Also wanting water, advised he can only have ice chips. Ice chips given.

## 2022-01-22 NOTE — PHARMACY-ADMISSION MEDICATION HISTORY
Pharmacy Note - Admission Medication History    Pertinent Provider Information: N/A     ______________________________________________________________________    Prior To Admission (PTA) med list completed and updated in EMR.       PTA Med List   Medication Sig Last Dose     cloNIDine (CATAPRES) 0.1 MG tablet Take 0.1 mg by mouth 2 times daily as needed Past Week at prn     fexofenadine-pseudoePHEDrine (ALLEGRA-D 24) 180-240 MG per tablet Take 1 tablet by mouth daily as needed  Past Month at prn     hydrOXYzine (ATARAX) 25 MG tablet Take 25 mg by mouth 4 times daily as needed Past Week at prn     ondansetron (ZOFRAN-ODT) 4 MG ODT tab Place 4 mg under the tongue every 8 hours as needed Past Week at prn     pantoprazole (PROTONIX) 40 MG EC tablet Take 40 mg by mouth daily 1/20/2022 at Unknown time     traZODone (DESYREL) 50 MG tablet Take 50 mg by mouth nightly as needed 1/20/2022 at Unknown time     zolpidem (AMBIEN) 10 MG tablet Take 10 mg by mouth nightly as needed Past Week at prn       Information source(s): Patient and CareEverywhere/[facility]  Method of interview communication: in-person    Summary of Changes to PTA Med List  New: ondansetron, pantoprazole, zolpidem, clonidine, hydroxyzine, trazodone  Discontinued: divalproex, mometasone, quetiapine, sucralfate, bupropion, oxycodone  Changed: N/A    Patient was asked about OTC/herbal products specifically.  PTA med list reflects this.    In the past week, patient estimated taking medication this percent of the time:  50-90% due to other.    Allergies were reviewed, assessed, and updated with the patient.      Patient does not use any multi-dose medications prior to admission.    The information provided in this note is only as accurate as the sources available at the time of the update(s).    Thank you for the opportunity to participate in the care of this patient.    Marta Blue  1/22/2022 8:25 AM

## 2022-01-22 NOTE — H&P
[facility]    History and Physical - Hospitalist Service       Date of Admission:  1/21/2022    Assessment & Plan      Javier Rivas is a 26 year old male admitted on 1/21/2022.      26-year-old male with history of polysubstance abuse [alcohol, cannabis, bipolar and panic disorders presented with abdominal pain.  Admitted for acute pancreatitis and alcohol abuse with risk of withdrawal.    Acute pancreatitis  -CT abdomen reveals edema of the pancreatic head-adjacent duodenitis not excluded.  Lipase slightly high at 70.  -N.p.o., IV fluid hydration, pain control, PPI for possible associated duodenitis, GI consult    Alcohol Intoxication with risk of withdrawal  - On presentation Alcohol level high 363  -CIWA protocol, multivitamin folate thiamine    Incidental imaging findings-nonobstructing right renal calculus.  Outpatient follow-up.    Smokeless tobacco use-nicotine patch ordered       Diet: NPO for Medical/Clinical Reasons Except for: Ice Chips, Meds    DVT Prophylaxis: Pneumatic Compression Devices  Becerra Catheter: Not present  Central Lines: None  Cardiac Monitoring: None  Code Status:  Full    Clinically Significant Risk Factors Present on Admission                    Disposition Plan   Expected Discharge:      The patient's care was discussed with the Patient.    Thony Pizarro MD  Hospitalist Service  [facility]  Securely message with the [employer] Web Console (learn more here)  Text page via [employer] Paging/Directory         ______________________________________________________________________    Chief Complaint   Abdominal pain       History is obtained from the patient    History of Present Illness   Javier Rivas  26-year-old male with history of alcohol abuse, bipolar and panic disorders presented with abdominal pain.  Reports started to have pain a day prior  around epigastrium and left upper quadrant region.  Also reports associated nausea but no  vomiting.  Had previous episodes of pancreatitis after which he quit alcohol for few months and started drinking again recently.  He drinks about 750 mL of vodka every day for the last few weeks, reports history of withdrawal seizures in the past, denies any chest pain, shortness of breath, cough, diarrhea, fever or chills.  Upon eval at ED noted to be tachycardic, with unremarkable labs except for mild elevation of lipase,but CT abdomen showing pancreatic edema consistent with pancreatitis.  Started on IV fluids, CIWA protocol, pain control and admitted for further management.    Review of Systems    The 10 point Review of Systems is negative other than noted in the HPI or here.     Past Medical History    I have reviewed this patient's medical history and updated it with pertinent information if needed.   Past Medical History:   Diagnosis Date     Allergic state      Anxiety      Depressive disorder        Past Surgical History   I have reviewed this patient's surgical history and updated it with pertinent information if needed.  History reviewed. No pertinent surgical history.    Social History   I have reviewed this patient's social history and updated it with pertinent information if needed.  Social History     Tobacco Use     Smoking status: Never Smoker     Smokeless tobacco: Current User   Substance Use Topics     Alcohol use: Yes     Comment: occ.     Drug use: Yes     Types: Marijuana       Family History   Family history noncontributory to current complaint    Prior to Admission Medications   Prior to Admission Medications   Prescriptions Last Dose Informant Patient Reported? Taking?   QUEtiapine (SEROQUEL) 25 MG tablet   No No   Sig: Take 1-2 tablets (25-50 mg) by mouth every 6 hours as needed (agitation)   divalproex (DEPAKOTE ER) 250 MG 24 hr tablet   No No   Sig: Take 3 tablets (750 mg) by mouth At Bedtime   fexofenadine-pseudoePHEDrine (ALLEGRA-D 24) 180-240 MG per tablet   Yes No   Sig: Take 1  tablet by mouth daily   mometasone (NASONEX) 50 MCG/ACT nasal spray   Yes No   Sig: Spray 2 sprays into both nostrils daily      Facility-Administered Medications: None     Allergies   No Known Allergies    Physical Exam   Vital Signs: Temp: 98  F (36.7  C) Temp src: Oral BP: 126/71 Pulse: (!) 124   Resp: 16 SpO2: 92 % O2 Device: None (Room air)    Weight: 160 lbs 0 oz    General Appearance: Alert oriented  Eyes: Pink conjunctiva, NIS  HEENT: PERRLA  EOMI  Respiratory: Clear chest bilaterally  Cardiovascular: S1-S2, tachycardic, no murmur heard  GI: Soft nontender abdomen  Genitourinary: No SP tenderness  Skin: No skin rashes  Musculoskeletal: No edema  Neurologic: AOx3      Data   Data reviewed today: I reviewed all medications, new labs and imaging results over the last 24 hours. I personally reviewed           Recent Labs   Lab 01/21/22  2141   WBC 6.8   HGB 15.9   MCV 90         POTASSIUM 3.9   CHLORIDE 104   CO2 25   BUN 11   CR 0.80   ANIONGAP 15   SOUTH 9.2   GLC 94   ALBUMIN 4.6   PROTTOTAL 8.2*   BILITOTAL 0.4   ALKPHOS 58   ALT 39   AST 39   LIPASE 70*     Recent Results (from the past 24 hour(s))   CT Abdomen Pelvis w Contrast    Narrative    EXAM: CT ABDOMEN PELVIS W CONTRAST  LOCATION: [facility]  DATE/TIME: 1/21/2022 10:35 PM    INDICATION: Left lower quadrant pain.  COMPARISON: 12/30/2021  TECHNIQUE: CT scan of the abdomen and pelvis was performed following injection of IV contrast. Multiplanar reformats were obtained. Dose reduction techniques were used.  CONTRAST: isovue 370 100ml    FINDINGS:   LOWER CHEST: Lung bases clear.    HEPATOBILIARY: Hepatic steatosis.    PANCREAS: Edema of the pancreatic head wall thickening of adjacent duodenum not excluded.    SPLEEN: Normal.    ADRENAL GLANDS: Normal.    KIDNEYS/BLADDER: Subcentimeter renal hypodensities small for characterization. Nonobstructing right renal calculus.    BOWEL: Underdistention of the colon reduces  evaluation for wall thickening/colitis. The appendix is normal. Bowel is normal caliber.    LYMPH NODES: Normal.    VASCULATURE: Unremarkable.    PELVIC ORGANS: Prostate calcification.    MUSCULOSKELETAL: Normal.      Impression    IMPRESSION:   1.  Edema of the pancreatic head and wall thickening of the adjacent duodenum not excluded. Correlate clinically for pancreatitis versus duodenitis.  2.  Colon is underdistended reducing evaluation for wall thickening/early changes of colitis.  3.  Nonobstructing right renal calculus.  4.  Fatty liver.  5.  Normal appendix.

## 2022-01-22 NOTE — CONSULTS
CONSULTING PHYSICIAN   Thony Pizarro MD     REASON FOR CONSULTATION   abdominal pain, pancreatitis      CHIEF COMPLAINT   Javier Rivas came to the hospital for evaluation of abdominal pain      HISTORY OF PRESENT ILLNESS   Javier Rivas is a 26 year old male with hx of pancreatitis admitted with abdominal pain    Patient reports a hx of ETOH abuse, last drink day of admission (), hx of pancreatitis admitted with abdominal pain    Patient notes onset of abdominal pain yesterday with radiation to his back. Mid abd in location. Similar to previous episodes of pancreatitis    Patient reports 8-10 episodes of pancreatitis in the past.     ETOH abuse hx. Does not smoke but does vape multiple times daily. Uses marijuana.     CT with pancreatitis without fluid collection   Lipase 70    In between episodes, he denies chronic abd pain. Denies steatorrhea. No hx of elevated blood glucose.     PAST HISTORY   Past Medical History:   Diagnosis Date     Allergic state      Anxiety      Depressive disorder       History reviewed. No pertinent surgical history.     Family History Social History   History reviewed. No pertinent family history. Social History     Tobacco Use     Smoking status: Never Smoker     Smokeless tobacco: Current User   Substance Use Topics     Alcohol use: Yes     Comment: occ.     Drug use: Yes     Types: Marijuana          MEDICATIONS & ALLERGIES   (Not in a hospital admission)       ALLERGIES   No Known Allergies      REVIEW OF SYSTEMS   A comprehensive review of systems was performed and was otherwise noncontributory.     OBJECTIVE   Vitals Blood pressure 129/72, pulse 96, temperature 98  F (36.7  C), temperature source Oral, resp. rate 20, height 1.829 m (6'), weight 72.6 kg (160 lb), SpO2 95 %.           Physical  Exam   GENERAL: alert and oriented, well nourished in no apparent distress    SKIN: warm and dry, no rashes    HEENT: atraumatic, anicteric,  moist mucous membranes, neck soft/supple     PULMONARY: normal resp effort, breath sounds clear to auscultation bilateral    CARDIOVASCULAR: normal rate and rhythm, no murmurs, no edema    ABDOMEN: tenderness in mid abd no distention, bowel sounds normal    MUSCULOSKELETAL: joints and gait normal    NEUROLOGICAL: appropriate mental status, grossly intact  DERM: no rash, no jaundice    PSYCHIATRIC: normal mood, affect and insight        LABORATORY    ELECTROLYTE PANEL   Recent Labs   Lab 01/21/22 2141      POTASSIUM 3.9   CHLORIDE 104   CO2 25   GLC 94   CR 0.80   BUN 11      HEMATOLOGY PANEL   Recent Labs   Lab 01/21/22 2141   HGB 15.9   MCV 90   WBC 6.8         LIVER AND PANCREAS PANEL   Recent Labs   Lab 01/21/22 2141   AST 39   ALT 39   ALKPHOS 58   BILITOTAL 0.4   LIPASE 70*     IMAGING STUDIES    EXAM: CT ABDOMEN PELVIS W CONTRAST  LOCATION: [facility]  DATE/TIME: 1/21/2022 10:35 PM     INDICATION: Left lower quadrant pain.  COMPARISON: 12/30/2021  TECHNIQUE: CT scan of the abdomen and pelvis was performed following injection of IV contrast. Multiplanar reformats were obtained. Dose reduction techniques were used.  CONTRAST: isovue 370 100ml     FINDINGS:   LOWER CHEST: Lung bases clear.     HEPATOBILIARY: Hepatic steatosis.     PANCREAS: Edema of the pancreatic head wall thickening of adjacent duodenum not excluded.     SPLEEN: Normal.     ADRENAL GLANDS: Normal.     KIDNEYS/BLADDER: Subcentimeter renal hypodensities small for characterization. Nonobstructing right renal calculus.     BOWEL: Underdistention of the colon reduces evaluation for wall thickening/colitis. The appendix is normal. Bowel is normal caliber.     LYMPH NODES: Normal.     VASCULATURE: Unremarkable.     PELVIC ORGANS: Prostate calcification.     MUSCULOSKELETAL: Normal.                                                                      IMPRESSION:   1.  Edema of the pancreatic head and wall  thickening of the adjacent duodenum not excluded. Correlate clinically for pancreatitis versus duodenitis.  2.  Colon is underdistended reducing evaluation for wall thickening/early changes of colitis.  3.  Nonobstructing right renal calculus.  4.  Fatty liver.  5.  Normal appendix.EXAM: CT ABDOMEN PELVIS W CONTRAST  LOCATION: [facility]  DATE/TIME: 1/21/2022 10:35 PM     INDICATION: Left lower quadrant pain.  COMPARISON: 12/30/2021  TECHNIQUE: CT scan of the abdomen and pelvis was performed following injection of IV contrast. Multiplanar reformats were obtained. Dose reduction techniques were used.  CONTRAST: isovue 370 100ml     FINDINGS:   LOWER CHEST: Lung bases clear.     HEPATOBILIARY: Hepatic steatosis.     PANCREAS: Edema of the pancreatic head wall thickening of adjacent duodenum not excluded.     SPLEEN: Normal.     ADRENAL GLANDS: Normal.     KIDNEYS/BLADDER: Subcentimeter renal hypodensities small for characterization. Nonobstructing right renal calculus.     BOWEL: Underdistention of the colon reduces evaluation for wall thickening/colitis. The appendix is normal. Bowel is normal caliber.     LYMPH NODES: Normal.     VASCULATURE: Unremarkable.     PELVIC ORGANS: Prostate calcification.     MUSCULOSKELETAL: Normal.                                                                      IMPRESSION:   1.  Edema of the pancreatic head and wall thickening of the adjacent duodenum not excluded. Correlate clinically for pancreatitis versus duodenitis.  2.  Colon is underdistended reducing evaluation for wall thickening/early changes of colitis.  3.  Nonobstructing right renal calculus.  4.  Fatty liver.  5.  Normal appendix.    I have reviewed the current diagnostic and laboratory tests.           IMPRESSION   Javier Rivas is a 26 year old male with recurrent pancreatitis in setting of chronic ETOH and tobacco abuse    Pancreatitis- recurrent. No evidence of calcification on imaging  but at risk for chronic pancreatitis.   Discussed ETOH and tobacco cessation  IVF  Pain control  NPO until off IV narcotics.              Flaco Roman MD  Thank you for the opportunity to participate in the care of this patient.   Please feel free to call me with any questions or concerns.  Phone number [phone].

## 2022-01-22 NOTE — ED NOTES
"[facility] ED Handoff Report    ED Chief Complaint: ETOH abuse, LUQ pain    ED Diagnosis:  (K85.90) Acute pancreatitis  (primary encounter diagnosis)  Comment: Receiving IV fluids and PRN pain medications  Plan: Continue to monitor.  (R10.12) LUQ abdominal pain  Comment:  Plan: Continue to monitor.    (R11.0) Nausea  Comment: Currently denies N/V   Plan: Continue to monitor.  (F10.220) Alcohol dependence with uncomplicated intoxication (H)  Comment:   Plan: Continue to monitor CIWA scale.    (K29.80) Duodenitis  Comment:   Plan: Continue to monitor     PMH:    Past Medical History:   Diagnosis Date     Allergic state      Anxiety      Depressive disorder         Code Status:  Full Code     Falls Risk: Yes Band: Applied    Current Living Situation/Residence: lives in a house     Elimination Status: Continent: Yes     Activity Level: SBA    Patients Preferred Language:  English     Needed: No    Vital Signs:  /77 (BP Location: Left arm)   Pulse 103   Temp 98  F (36.7  C) (Oral)   Resp 20   Ht 1.829 m (6')   Wt 72.6 kg (160 lb)   SpO2 96%   BMI 21.70 kg/m       Cardiac Rhythm: NSR    Pain Score: 7/10    Is the Patient Confused:  No    Last Food or Drink: 1/21/22. Currently NPO except ice chips.    Focused Assessment:  Gastrointestinal/CIWA.    Tests Performed: Done: Labs and Imaging    Treatments Provided: IV fluids, medications    Family Dynamics/Concerns: Yes; please explain: unsure except patient stated he is currently living with his grandparents due to \"what is going on\".    Family Updated On Visitor Policy: No    Plan of Care Communicated to Family: Yes    Who Was Updated about Plan of Care: Patient    Belongings Checklist Done and Signed by Patient: No    Medications sent with patient: nicotine patch.    Covid: asymptomatic , negative    Additional Information:     RN:  1/22/2022 4:30 PM     "

## 2022-01-22 NOTE — ED NOTES
"Pt frequently requesting pain meds while playing on his phone. He states \"I'm in so much pain\".   "

## 2022-01-22 NOTE — ED PROVIDER NOTES
EMERGENCY DEPARTMENT ENCOUNTER      NAME: Javier Rivas  AGE: 26 year old male  YOB: 1995  MRN: [id number]  EVALUATION DATE & TIME: No admission date for patient encounter.    PCP: Alejandro, [facility]    ED PROVIDER: Belinda Kaur PA-C      Chief Complaint   Patient presents with     Abdominal Pain         FINAL IMPRESSION:  1. Acute pancreatitis    2. LUQ abdominal pain    3. Nausea    4. Alcohol dependence with uncomplicated intoxication (H)    5. Duodenitis          ED COURSE & MEDICAL DECISION MAKIN:39 PM I introduced myself to patient, performed initial HPI and examination.   11:30 PM Updated patient on results. Staffed with Dr. Timmons. Paged for hospitalist.  12:17 AM Spoke with hospitalist, Dr. Pizarro, regarding plan for admission. MINDS protocol ordered with anticipation of withdrawal during hospitalization. Does have a history of alcohol withdrawal seizures.      26 year old male with PMH alcohol dependence, epigastric pain presents to the Emergency Department for evaluation of LUQ abdominal pain. Reports pain for approximately 6 hours PTA. Reports nausea without vomiting. No changes in bowel movements. No dysuria. Pain does radiate into back. Does report recent ETOH use, last used 20 minutes PTA. Denies withdrawal symptoms. Does have a history of significant withdrawal with prior seizures.    Differential includes gastritis, PUD, pancreatitis, obstruction, diverticulitis/colitis, and others.     Patient is uncomfortable appearing, nontoxic. Very tender diffusely to abdomen, more notable to LUQ. No distention.   Vital signs: Afebrile, tachycardic. Not hypoxic.     EKG obtained with patient reporting referred pain to chest, ultimately nonischemic. No arrhythmia. Sinus tachycardia. Troponin is negative.  CBC with no leukocytosis, No anemia. No electrolyte derangement, creatinine is normal. UA without evidence of infection, no ketones or evidence of dehydration.   LFT with no  notable abnormalities. Lipase surprisingly only slightly elevated (70). ETOH 363.    COVID testing obtained with anticipated admission, ultimately negative.    CT obtained, edema of pancreatic head and wall thickening of adjacent duodenum not excluded; correlate clinically for pancreatitis vs duodenitis. Colon under-distended. Non-obstructing right renal calculus. Normal appendix.     Clinically, presentation is most consistent with pancreatitis, especially with recent significant ETOH consumption. Surprisingly lipase is not more elevated, could indicate early pancreatitis as patient reports only 6 hours of symptoms.  Could also be duodenitis/early ulcer causing inflammation and pain. Denies dark/bloody stools and no recent hematemesis.     Patient given IV fluids, dilaudid, protonix 80 mg. Patient still remains very uncomfortable, ultimately not appropriate for outpatient management at this time. Plan for admission for further hydration, analgesics, and evaluation/management.     Spoke with hospitalist, Dr. Pizarro, who accepts admission.       MEDICATIONS GIVEN IN THE EMERGENCY:  Medications   HYDROmorphone (DILAUDID) injection 0.5 mg (has no administration in time range)   pantoprazole (PROTONIX) IV push injection 40 mg (has no administration in time range)   0.9% sodium chloride BOLUS (1,000 mLs Intravenous New Bag 1/21/22 2211)   ondansetron (ZOFRAN) injection 4 mg (4 mg Intravenous Given 1/21/22 2213)   HYDROmorphone (DILAUDID) injection 0.5 mg (0.5 mg Intravenous Given 1/21/22 2215)   pantoprazole (PROTONIX) IV push injection 40 mg (40 mg Intravenous Given 1/21/22 2219)   iopamidol (ISOVUE-370) solution 100 mL (100 mLs Intravenous Given 1/21/22 2246)       NEW PRESCRIPTIONS STARTED AT TODAY'S ER VISIT  [unfilled]       =================================================================    HPI    Patient information was obtained from: Patient     Use of : N/A         Javier Rivas is a 26  year old male with a pertinent history of alcohol dependence, alcoholic pancreatitis, hypertension, and hypokalemia who presents to this ED by ambulance for evaluation of abdominal pain.     Per chart review, the patient was seen in [facility] 1/13/2022 for abdominal pain, epigastric. The patient had a scope with no significant findings. The patient said he was told it was most likely to be pancreatitis. Lipase was normal. Chest x-ray and troponin were negative. The patient was discharged and told to continue Protonix, take Acetaminophen as needed for pain, take fluids, and follow up with gastroenterologist.    The patient was seen in the [facility] 1/13/22 (8 days ago). The patient began drinking alcohol immediately after that visit, stating that he has alcohol dependence. He adds that he has withdrawal symptoms of seizures if he does not drink alcohol.    The patient reports that he has been in pain for 6 hours. He states that his last drink was 20 minutes ago and that he is not experiencing any withdrawal symptoms right now. Currently he has some abdominal pain on his left sides that radiate to his back. He believes that this might be pancreatitis as he has had it in the past. No black or bloody stools. No prior stomach surgeries. The patient reports going to rehab 8 times. He is interested in quitting. No fevers or chills. No other symptoms or complaints at this time.    REVIEW OF SYSTEMS   Review of Systems   Constitutional: Negative for chills and fever.   Gastrointestinal: Positive for abdominal pain. Negative for blood in stool.   Musculoskeletal: Positive for back pain.   All other systems reviewed and are negative.    PAST MEDICAL HISTORY:  Past Medical History:   Diagnosis Date     Allergic state      Anxiety      Depressive disorder        PAST SURGICAL HISTORY:  History reviewed. No pertinent surgical history.    CURRENT MEDICATIONS:    divalproex (DEPAKOTE ER) 250 MG 24  hr tablet  fexofenadine-pseudoePHEDrine (ALLEGRA-D 24) 180-240 MG per tablet  mometasone (NASONEX) 50 MCG/ACT nasal spray  QUEtiapine (SEROQUEL) 25 MG tablet        ALLERGIES:  No Known Allergies    FAMILY HISTORY:  History reviewed. No pertinent family history.    SOCIAL HISTORY:   Social History     Socioeconomic History     Marital status: Single     Spouse name: Not on file     Number of children: Not on file     Years of education: Not on file     Highest education level: Not on file   Occupational History     Not on file   Tobacco Use     Smoking status: Never Smoker     Smokeless tobacco: Current User   Substance and Sexual Activity     Alcohol use: Yes     Comment: occ.     Drug use: Yes     Types: Marijuana     Sexual activity: Not on file   Other Topics Concern     Not on file   Social History Narrative     Not on file     Social Determinants of Health     Financial Resource Strain: Not on file   Food Insecurity: Not on file   Transportation Needs: Not on file   Physical Activity: Not on file   Stress: Not on file   Social Connections: Not on file   Intimate Partner Violence: Not on file   Housing Stability: Not on file       VITALS:  /82   Pulse 112   Temp 98  F (36.7  C) (Oral)   Resp 22   Ht 1.829 m (6')   Wt 72.6 kg (160 lb)   SpO2 98%   BMI 21.70 kg/m      PHYSICAL EXAM    Constitutional: Well developed, Well nourished, NAD  HENT: Normocephalic, Atraumatic, Oropharynx clear.   Neck- Supple, Nontender. Normal ROM.   Eyes: Conjunctiva normal  Respiratory: No respiratory distress, speaking in full sentences. Normal breath sounds  Cardiovascular: Tachycardic, Regular rhythm, No murmurs.   GI: Soft, nondistended. Diffuse abdominal tenderness more notable to LUQ. No CVA tenderness.   Musculoskeletal: No deformities, Moves all extremities equally.   Integument: Warm, Dry, No erythema, ecchymosis, or rash.  Neurologic: Alert & oriented x 3, Normal sensory function. No focal deficits.    Psychiatric: Affect normal, Judgment normal, Mood normal. Cooperative.      LAB:  All pertinent labs reviewed and interpreted.  Results for orders placed or performed during the hospital encounter of 01/21/22   CT Abdomen Pelvis w Contrast    Impression    IMPRESSION:   1.  Edema of the pancreatic head and wall thickening of the adjacent duodenum not excluded. Correlate clinically for pancreatitis versus duodenitis.  2.  Colon is underdistended reducing evaluation for wall thickening/early changes of colitis.  3.  Nonobstructing right renal calculus.  4.  Fatty liver.  5.  Normal appendix.   Basic metabolic panel   Result Value Ref Range    Sodium 144 136 - 145 mmol/L    Potassium 3.9 3.5 - 5.0 mmol/L    Chloride 104 98 - 107 mmol/L    Carbon Dioxide (CO2) 25 22 - 31 mmol/L    Anion Gap 15 5 - 18 mmol/L    Urea Nitrogen 11 8 - 22 mg/dL    Creatinine 0.80 0.70 - 1.30 mg/dL    Calcium 9.2 8.5 - 10.5 mg/dL    Glucose 94 70 - 125 mg/dL    GFR Estimate >90 >60 mL/min/1.73m2   Hepatic function panel   Result Value Ref Range    Bilirubin Total 0.4 0.0 - 1.0 mg/dL    Bilirubin Direct 0.1 <=0.5 mg/dL    Protein Total 8.2 (H) 6.0 - 8.0 g/dL    Albumin 4.6 3.5 - 5.0 g/dL    Alkaline Phosphatase 58 45 - 120 U/L    AST 39 0 - 40 U/L    ALT 39 0 - 45 U/L   Result Value Ref Range    Lipase 70 (H) 0 - 52 U/L   CBC with platelets and differential   Result Value Ref Range    WBC Count 6.8 4.0 - 11.0 10e3/uL    RBC Count 5.28 4.40 - 5.90 10e6/uL    Hemoglobin 15.9 13.3 - 17.7 g/dL    Hematocrit 47.3 40.0 - 53.0 %    MCV 90 78 - 100 fL    MCH 30.1 26.5 - 33.0 pg    MCHC 33.6 31.5 - 36.5 g/dL    RDW 13.4 10.0 - 15.0 %    Platelet Count 289 150 - 450 10e3/uL    % Neutrophils 52 %    % Lymphocytes 37 %    % Monocytes 9 %    % Eosinophils 1 %    % Basophils 1 %    % Immature Granulocytes 0 %    NRBCs per 100 WBC 0 <1 /100    Absolute Neutrophils 3.6 1.6 - 8.3 10e3/uL    Absolute Lymphocytes 2.5 0.8 - 5.3 10e3/uL    Absolute Monocytes 0.6  0.0 - 1.3 10e3/uL    Absolute Eosinophils 0.0 0.0 - 0.7 10e3/uL    Absolute Basophils 0.1 0.0 - 0.2 10e3/uL    Absolute Immature Granulocytes 0.0 <=0.4 10e3/uL    Absolute NRBCs 0.0 10e3/uL   UA with Microscopic reflex to Culture    Specimen: Urine, Midstream   Result Value Ref Range    Color Urine Light Yellow Colorless, Straw, Light Yellow, Yellow    Appearance Urine Clear Clear    Glucose Urine Negative Negative mg/dL    Bilirubin Urine Negative Negative    Ketones Urine Negative Negative mg/dL    Specific Gravity Urine 1.017 1.001 - 1.030    Blood Urine Negative Negative    pH Urine 6.0 5.0 - 7.0    Protein Albumin Urine 10  (A) Negative mg/dL    Urobilinogen Urine <2.0 <2.0 mg/dL    Nitrite Urine Negative Negative    Leukocyte Esterase Urine Negative Negative    Mucus Urine Present (A) None Seen /LPF    RBC Urine 0 <=2 /HPF    WBC Urine 0 <=5 /HPF   Troponin I (now)   Result Value Ref Range    Troponin I <0.01 0.00 - 0.29 ng/mL   Alcohol level blood   Result Value Ref Range    Alcohol, Blood 363 (H) None detected mg/dL   Asymptomatic COVID-19 Virus (Coronavirus) by PCR Nasopharyngeal    Specimen: Nasopharyngeal; Swab   Result Value Ref Range    SARS CoV2 PCR Negative Negative       RADIOLOGY:  Reviewed all pertinent imaging. Please see official radiology report.  CT Abdomen Pelvis w Contrast   Final Result   IMPRESSION:    1.  Edema of the pancreatic head and wall thickening of the adjacent duodenum not excluded. Correlate clinically for pancreatitis versus duodenitis.   2.  Colon is underdistended reducing evaluation for wall thickening/early changes of colitis.   3.  Nonobstructing right renal calculus.   4.  Fatty liver.   5.  Normal appendix.          EKG:    Performed at: 21-Jan-2022 22:11:21    Impression: Sinus tachycardia, No STEMI    Rate: 107 BPM  NC Interval: 140 ms  QRS Interval: 88 ms  QTc Interval: 332/443 ms  ST Changes: None  Comparison: None    Dr. Beard and I have independently reviewed and  interpreted the EKG(s) documented above.    PROCEDURES:   None      I, Earl Byrd, am serving as a scribe to document services personally performed by Belinda Kaur PA-C based on my observation and the provider's statements to me. I, Belinda Kaur PA-C, attest that Earl Byrd is acting in a scribe capacity, has observed my performance of the services and has documented them in accordance with my direction.    Belinda Kaur PA-C  Emergency Medicine  [facility] EMERGENCY DEPARTMENT  [address] [place] [id number]  [phone]           Belinda Kaur PA-C  01/22/22 0018

## 2022-01-22 NOTE — CONSULTS
"Care Management Initial Consult    General Information  Assessment completed with: Javier Causey  Type of CM/SW Visit: Initial Assessment    Primary Care Provider verified and updated as needed: Yes   Readmission within the last 30 days: no previous admission in last 30 days      Reason for Consult: discharge planning  Advance Care Planning: Advance Care Planning Reviewed: other (comment) (\"I don't have one\")          Communication Assessment  Patient's communication style: spoken language (English or Bilingual)             Cognitive  Cognitive/Neuro/Behavioral: WDL                      Living Environment:   People in home: grandparent(s)     Current living Arrangements:  (\"town house\")      Able to return to prior arrangements: yes       Family/Social Support:  Care provided by: self  Provides care for: no one, unable/limited ability to care for self     Grandparent(s)          Description of Support System: Supportive,Involved    Support Assessment: Adequate family and caregiver support,Adequate social supports,Patient communicates needs well met    Current Resources:   Patient receiving home care services: No     Community Resources: None  Equipment currently used at home: none  Supplies currently used at home: Other (\"blue light glasses for the computer and I use it a lot\")    Employment/Financial:  Employment Status: employed full-time     Employment/ Comments: \"no  history\"  Financial Concerns: insurance, none (\"no insurance until 2/1/22 when I start [employer] insurance\")   Referral to Financial Counselor: Yes       Lifestyle & Psychosocial Needs:  Social Determinants of Health     Tobacco Use: High Risk     Smoking Tobacco Use: Never Smoker     Smokeless Tobacco Use: Current User   Alcohol Use: Not on file   Financial Resource Strain: Not on file   Food Insecurity: Not on file   Transportation Needs: Not on file   Physical Activity: Not on file   Stress: Not on file   Social Connections: Not on " "file   Intimate Partner Violence: Not on file   Depression: Not on file   Housing Stability: Not on file       Functional Status:  Prior to admission patient needed assistance:   Dependent ADLs:: Independent,Ambulation-no assistive device  Dependent IADLs:: Transportation (\"I don't drive now\")  Assesssment of Functional Status: At functional baseline    Mental Health Status:          Chemical Dependency Status:  Chemical Dependency Status: Current Concern             Values/Beliefs:  Spiritual, Cultural Beliefs, Nondenominational Practices, Values that affect care:                 Additional Information:  Javier lives in a town house with his grandparents. He is independent with ADLs at baseline. He states, \"I work from home now\".    May need CD resources before discharge.    He states, \"I have no insurance right now, and I start with [employer] insurance on 2/1/22.\"    Family to transport at discharge.    Ann Tiwari RN      "

## 2022-01-22 NOTE — ED PROVIDER NOTES
"Emergency Department Midlevel Supervisory Note     I personally saw the patient and performed a substantive portion of the visit including all aspects of the medical decision making.    ED Course:  11:25 PM Belinda Kaur PA-C staffed patient with me. I agree with their assessment and plan of management, and I will see the patient.  11:58 PM I met with the patient to introduce myself, gather additional history, perform my initial exam, update with patient's results and discuss the plan for admission. He was agreeable with the plan.     Brief HPI:     Javier Rivas is a 26 year old male who presents for evaluation of left sided abdominal pain. He was previously evaluated on 1/13/22 at [facility] and dranked alcohol immediately afterwards which he is a known alcoholic. Today, he developed sudden onset of epigastric abdominal pain for the past 6 hours when he was with his grandparents but has not had any alcohol withdrawal symptoms. He says he has not been drinking recently. Prior to arrival, he did take Tylenol with no relief which prompt him to come into ED tonight. No prior ulcers. No prior abdominal surgeries. With his previous alcohol withdrawals, he has had one seizure before and \"gnarly\" symptoms. He states he was hospitalized for prior alcohol withdrawal in [place] where he was unable to recall any memory and had woken up to paramedics.     No fever, chills or any other medical complaints at this time.     I, Renee Byrd, am serving as a scribe to document services personally performed by Mimi Timmons, based on my observations and the provider's statements to me.   I, Mimi Timmons, attest that Renee Byrd was acting in a scribe capacity, has observed my performance of the services and has documented them in accordance with my direction.    Brief Physical Exam:  Constitutional:  Alert, in no acute distress  EYES: Conjunctivae clear  HENT:  Atraumatic, normocephalic  Respiratory:  " Respirations even, unlabored, in no acute respiratory distress  Cardiovascular:  Regular rate and rhythm, good peripheral perfusion  GI: Soft, nondistended, left sided abdominal pain, no palpable masses, no rebound, no guarding   Musculoskeletal:  No edema. No cyanosis. Range of motion major extremities intact.    Integument: Warm, Dry, No erythema, No rash.   Neurologic:  Alert & oriented, no focal deficits noted  Psych: Normal mood and affect     MDM:    ED Course as of 01/22/22 0012   Sat Jan 22, 2022   0010 Patient was seen and labs and imaging reviewed.  His pain is primarily left-sided and certainly that can go along with just the inflammation of the pancreatic head if this is isolated pancreatitis, however, given the low lipase elevation and onset of symptoms quite early in the day, I think that he is high risk for developing duodenal ulcer.  Given the inflammation that it would be high risk for rupture.  He has no current symptoms of bleeding related to that but we are going to give him the higher dose of Protonix for now to help to protect from that.  Given the degree of pain will plan to admit.  He is tachycardic and has the pancreatitis.  As with his admission back in April this would fit with SIRS but is likely noninfectious from the pancreatitis or duodenal inflammation from an ulcer.  Treatment is supportive with bowel rest.  He also has a history of significant withdrawal.  Currently he does not endorse significant anxiety, tremulousness, and his blood alcohol level is still significantly elevated.  Will need to put on alcohol withdrawal protocol, but at this time does not appear to be withdrawing.  No evidence of hemorrhage.   patient admitted to hospitalist.  Telemetry bed.    1. Acute pancreatitis    2. LUQ abdominal pain    3. Nausea    4. Alcohol dependence with uncomplicated intoxication (H)    5. Duodenitis        Labs and Imaging:  Results for orders placed or performed during the hospital  encounter of 01/21/22   CT Abdomen Pelvis w Contrast    Impression    IMPRESSION:   1.  Edema of the pancreatic head and wall thickening of the adjacent duodenum not excluded. Correlate clinically for pancreatitis versus duodenitis.  2.  Colon is underdistended reducing evaluation for wall thickening/early changes of colitis.  3.  Nonobstructing right renal calculus.  4.  Fatty liver.  5.  Normal appendix.   Basic metabolic panel   Result Value Ref Range    Sodium 144 136 - 145 mmol/L    Potassium 3.9 3.5 - 5.0 mmol/L    Chloride 104 98 - 107 mmol/L    Carbon Dioxide (CO2) 25 22 - 31 mmol/L    Anion Gap 15 5 - 18 mmol/L    Urea Nitrogen 11 8 - 22 mg/dL    Creatinine 0.80 0.70 - 1.30 mg/dL    Calcium 9.2 8.5 - 10.5 mg/dL    Glucose 94 70 - 125 mg/dL    GFR Estimate >90 >60 mL/min/1.73m2   Hepatic function panel   Result Value Ref Range    Bilirubin Total 0.4 0.0 - 1.0 mg/dL    Bilirubin Direct 0.1 <=0.5 mg/dL    Protein Total 8.2 (H) 6.0 - 8.0 g/dL    Albumin 4.6 3.5 - 5.0 g/dL    Alkaline Phosphatase 58 45 - 120 U/L    AST 39 0 - 40 U/L    ALT 39 0 - 45 U/L   Result Value Ref Range    Lipase 70 (H) 0 - 52 U/L   CBC with platelets and differential   Result Value Ref Range    WBC Count 6.8 4.0 - 11.0 10e3/uL    RBC Count 5.28 4.40 - 5.90 10e6/uL    Hemoglobin 15.9 13.3 - 17.7 g/dL    Hematocrit 47.3 40.0 - 53.0 %    MCV 90 78 - 100 fL    MCH 30.1 26.5 - 33.0 pg    MCHC 33.6 31.5 - 36.5 g/dL    RDW 13.4 10.0 - 15.0 %    Platelet Count 289 150 - 450 10e3/uL    % Neutrophils 52 %    % Lymphocytes 37 %    % Monocytes 9 %    % Eosinophils 1 %    % Basophils 1 %    % Immature Granulocytes 0 %    NRBCs per 100 WBC 0 <1 /100    Absolute Neutrophils 3.6 1.6 - 8.3 10e3/uL    Absolute Lymphocytes 2.5 0.8 - 5.3 10e3/uL    Absolute Monocytes 0.6 0.0 - 1.3 10e3/uL    Absolute Eosinophils 0.0 0.0 - 0.7 10e3/uL    Absolute Basophils 0.1 0.0 - 0.2 10e3/uL    Absolute Immature Granulocytes 0.0 <=0.4 10e3/uL    Absolute NRBCs 0.0  10e3/uL   UA with Microscopic reflex to Culture    Specimen: Urine, Midstream   Result Value Ref Range    Color Urine Light Yellow Colorless, Straw, Light Yellow, Yellow    Appearance Urine Clear Clear    Glucose Urine Negative Negative mg/dL    Bilirubin Urine Negative Negative    Ketones Urine Negative Negative mg/dL    Specific Gravity Urine 1.017 1.001 - 1.030    Blood Urine Negative Negative    pH Urine 6.0 5.0 - 7.0    Protein Albumin Urine 10  (A) Negative mg/dL    Urobilinogen Urine <2.0 <2.0 mg/dL    Nitrite Urine Negative Negative    Leukocyte Esterase Urine Negative Negative    Mucus Urine Present (A) None Seen /LPF    RBC Urine 0 <=2 /HPF    WBC Urine 0 <=5 /HPF   Troponin I (now)   Result Value Ref Range    Troponin I <0.01 0.00 - 0.29 ng/mL   Alcohol level blood   Result Value Ref Range    Alcohol, Blood 363 (H) None detected mg/dL   Asymptomatic COVID-19 Virus (Coronavirus) by PCR Nasopharyngeal    Specimen: Nasopharyngeal; Swab   Result Value Ref Range    SARS CoV2 PCR Negative Negative     I have reviewed the relevant laboratory and radiology studies    Procedures:  I was present for the key portions of this procedure: none    Mimi Timmons MD  [facility] EMERGENCY DEPARTMENT  [facility] [id number] [phone]     Mimi Timmons MD  01/22/22 0014

## 2022-01-22 NOTE — PLAN OF CARE
Problem: Adult Inpatient Plan of Care  Goal: Plan of Care Review  Outcome: Improving  Goal: Patient-Specific Goal (Individualized)  Outcome: Improving  Goal: Absence of Hospital-Acquired Illness or Injury  Outcome: Improving  Intervention: Identify and Manage Fall Risk  Recent Flowsheet Documentation  Taken 1/22/2022 0800 by Yusuf Floyd, RN  Safety Promotion/Fall Prevention: activity supervised  Goal: Optimal Comfort and Wellbeing  Outcome: Improving  Goal: Readiness for Transition of Care  Outcome: Improving   Pt is alert and oriented x4. Pt is med complaint. Pt received pain med for abd ache. Pt's v/s stable. Pt's ciwa was 5,10,2 this morning. Pt received prn Valium x1. Continue to monitor pt.

## 2022-01-22 NOTE — ED NOTES
"Pt wanted door open because room 23 was warm with no thermostat to adjust. While walking by staff noted pt getting water out of the sink, and drinking it. Pt advised he was limited to ice chips and meds, pt states \"but I'm so thirsty\".   "

## 2022-01-23 LAB
ALBUMIN SERPL-MCNC: 3.6 G/DL (ref 3.5–5)
ALP SERPL-CCNC: 46 U/L (ref 45–120)
ALT SERPL W P-5'-P-CCNC: 25 U/L (ref 0–45)
ANION GAP SERPL CALCULATED.3IONS-SCNC: 12 MMOL/L (ref 5–18)
AST SERPL W P-5'-P-CCNC: 32 U/L (ref 0–40)
BASOPHILS # BLD AUTO: 0 10E3/UL (ref 0–0.2)
BASOPHILS NFR BLD AUTO: 0 %
BILIRUB SERPL-MCNC: 0.9 MG/DL (ref 0–1)
BUN SERPL-MCNC: 3 MG/DL (ref 8–22)
CALCIUM SERPL-MCNC: 8.6 MG/DL (ref 8.5–10.5)
CHLORIDE BLD-SCNC: 99 MMOL/L (ref 98–107)
CO2 SERPL-SCNC: 24 MMOL/L (ref 22–31)
CREAT SERPL-MCNC: 0.69 MG/DL (ref 0.7–1.3)
EOSINOPHIL # BLD AUTO: 0.1 10E3/UL (ref 0–0.7)
EOSINOPHIL NFR BLD AUTO: 1 %
ERYTHROCYTE [DISTWIDTH] IN BLOOD BY AUTOMATED COUNT: 13.1 % (ref 10–15)
GFR SERPL CREATININE-BSD FRML MDRD: >90 ML/MIN/1.73M2
GLUCOSE BLD-MCNC: 69 MG/DL (ref 70–125)
HCT VFR BLD AUTO: 39.9 % (ref 40–53)
HGB BLD-MCNC: 13.2 G/DL (ref 13.3–17.7)
IMM GRANULOCYTES # BLD: 0 10E3/UL
IMM GRANULOCYTES NFR BLD: 0 %
LIPASE SERPL-CCNC: 360 U/L (ref 0–52)
LYMPHOCYTES # BLD AUTO: 0.5 10E3/UL (ref 0.8–5.3)
LYMPHOCYTES NFR BLD AUTO: 10 %
MCH RBC QN AUTO: 30.2 PG (ref 26.5–33)
MCHC RBC AUTO-ENTMCNC: 33.1 G/DL (ref 31.5–36.5)
MCV RBC AUTO: 91 FL (ref 78–100)
MONOCYTES # BLD AUTO: 0.7 10E3/UL (ref 0–1.3)
MONOCYTES NFR BLD AUTO: 14 %
NEUTROPHILS # BLD AUTO: 3.7 10E3/UL (ref 1.6–8.3)
NEUTROPHILS NFR BLD AUTO: 75 %
NRBC # BLD AUTO: 0 10E3/UL
NRBC BLD AUTO-RTO: 0 /100
PLATELET # BLD AUTO: 171 10E3/UL (ref 150–450)
POTASSIUM BLD-SCNC: 3.4 MMOL/L (ref 3.5–5)
POTASSIUM BLD-SCNC: 3.4 MMOL/L (ref 3.5–5)
POTASSIUM BLD-SCNC: 3.8 MMOL/L (ref 3.5–5)
PROT SERPL-MCNC: 6.3 G/DL (ref 6–8)
RBC # BLD AUTO: 4.37 10E6/UL (ref 4.4–5.9)
SODIUM SERPL-SCNC: 135 MMOL/L (ref 136–145)
WBC # BLD AUTO: 5 10E3/UL (ref 4–11)

## 2022-01-23 PROCEDURE — 250N000011 HC RX IP 250 OP 636: Performed by: CLINICAL NURSE SPECIALIST

## 2022-01-23 PROCEDURE — 250N000011 HC RX IP 250 OP 636: Performed by: INTERNAL MEDICINE

## 2022-01-23 PROCEDURE — 250N000013 HC RX MED GY IP 250 OP 250 PS 637: Performed by: HOSPITALIST

## 2022-01-23 PROCEDURE — 80053 COMPREHEN METABOLIC PANEL: CPT | Performed by: STUDENT IN AN ORGANIZED HEALTH CARE EDUCATION/TRAINING PROGRAM

## 2022-01-23 PROCEDURE — 258N000003 HC RX IP 258 OP 636: Performed by: STUDENT IN AN ORGANIZED HEALTH CARE EDUCATION/TRAINING PROGRAM

## 2022-01-23 PROCEDURE — 99222 1ST HOSP IP/OBS MODERATE 55: CPT | Performed by: CLINICAL NURSE SPECIALIST

## 2022-01-23 PROCEDURE — C9113 INJ PANTOPRAZOLE SODIUM, VIA: HCPCS | Performed by: STUDENT IN AN ORGANIZED HEALTH CARE EDUCATION/TRAINING PROGRAM

## 2022-01-23 PROCEDURE — 84132 ASSAY OF SERUM POTASSIUM: CPT | Performed by: HOSPITALIST

## 2022-01-23 PROCEDURE — 250N000013 HC RX MED GY IP 250 OP 250 PS 637: Performed by: STUDENT IN AN ORGANIZED HEALTH CARE EDUCATION/TRAINING PROGRAM

## 2022-01-23 PROCEDURE — 99232 SBSQ HOSP IP/OBS MODERATE 35: CPT | Performed by: HOSPITALIST

## 2022-01-23 PROCEDURE — 36415 COLL VENOUS BLD VENIPUNCTURE: CPT | Performed by: STUDENT IN AN ORGANIZED HEALTH CARE EDUCATION/TRAINING PROGRAM

## 2022-01-23 PROCEDURE — 83690 ASSAY OF LIPASE: CPT | Performed by: HOSPITALIST

## 2022-01-23 PROCEDURE — 258N000003 HC RX IP 258 OP 636: Performed by: HOSPITALIST

## 2022-01-23 PROCEDURE — G0378 HOSPITAL OBSERVATION PER HR: HCPCS

## 2022-01-23 PROCEDURE — 85025 COMPLETE CBC W/AUTO DIFF WBC: CPT | Performed by: STUDENT IN AN ORGANIZED HEALTH CARE EDUCATION/TRAINING PROGRAM

## 2022-01-23 PROCEDURE — 250N000013 HC RX MED GY IP 250 OP 250 PS 637: Performed by: PHYSICIAN ASSISTANT

## 2022-01-23 PROCEDURE — 99207 PR CDG-CODE CATEGORY CHANGED: CPT | Performed by: HOSPITALIST

## 2022-01-23 PROCEDURE — 120N000001 HC R&B MED SURG/OB

## 2022-01-23 PROCEDURE — 250N000011 HC RX IP 250 OP 636: Performed by: STUDENT IN AN ORGANIZED HEALTH CARE EDUCATION/TRAINING PROGRAM

## 2022-01-23 PROCEDURE — 36415 COLL VENOUS BLD VENIPUNCTURE: CPT | Performed by: HOSPITALIST

## 2022-01-23 RX ORDER — HYDRALAZINE HYDROCHLORIDE 20 MG/ML
5 INJECTION INTRAMUSCULAR; INTRAVENOUS EVERY 6 HOURS PRN
Status: DISCONTINUED | OUTPATIENT
Start: 2022-01-23 | End: 2022-01-28 | Stop reason: HOSPADM

## 2022-01-23 RX ORDER — POTASSIUM CHLORIDE 1500 MG/1
40 TABLET, EXTENDED RELEASE ORAL ONCE
Status: COMPLETED | OUTPATIENT
Start: 2022-01-23 | End: 2022-01-23

## 2022-01-23 RX ADMIN — NICOTINE 1 PATCH: 14 PATCH, EXTENDED RELEASE TRANSDERMAL at 08:43

## 2022-01-23 RX ADMIN — DIAZEPAM 10 MG: 10 TABLET ORAL at 13:31

## 2022-01-23 RX ADMIN — HYDROMORPHONE HYDROCHLORIDE 0.5 MG: 0.2 INJECTION, SOLUTION INTRAMUSCULAR; INTRAVENOUS; SUBCUTANEOUS at 08:41

## 2022-01-23 RX ADMIN — Medication: at 13:08

## 2022-01-23 RX ADMIN — HYDROMORPHONE HYDROCHLORIDE 1 MG: 1 INJECTION, SOLUTION INTRAMUSCULAR; INTRAVENOUS; SUBCUTANEOUS at 16:17

## 2022-01-23 RX ADMIN — Medication 1 TABLET: at 14:58

## 2022-01-23 RX ADMIN — HYDROMORPHONE HYDROCHLORIDE 0.5 MG: 0.2 INJECTION, SOLUTION INTRAMUSCULAR; INTRAVENOUS; SUBCUTANEOUS at 04:27

## 2022-01-23 RX ADMIN — DIAZEPAM 10 MG: 10 TABLET ORAL at 01:02

## 2022-01-23 RX ADMIN — CLONIDINE HYDROCHLORIDE 0.1 MG: 0.1 TABLET ORAL at 00:25

## 2022-01-23 RX ADMIN — HYDROMORPHONE HYDROCHLORIDE 0.5 MG: 0.2 INJECTION, SOLUTION INTRAMUSCULAR; INTRAVENOUS; SUBCUTANEOUS at 15:14

## 2022-01-23 RX ADMIN — PANTOPRAZOLE SODIUM 40 MG: 40 INJECTION, POWDER, FOR SOLUTION INTRAVENOUS at 08:43

## 2022-01-23 RX ADMIN — HYDROMORPHONE HYDROCHLORIDE 0.5 MG: 0.2 INJECTION, SOLUTION INTRAMUSCULAR; INTRAVENOUS; SUBCUTANEOUS at 13:08

## 2022-01-23 RX ADMIN — DIAZEPAM 10 MG: 10 TABLET ORAL at 04:26

## 2022-01-23 RX ADMIN — POTASSIUM CHLORIDE 40 MEQ: 1500 TABLET, EXTENDED RELEASE ORAL at 19:17

## 2022-01-23 RX ADMIN — Medication 100 MG: at 08:47

## 2022-01-23 RX ADMIN — NICOTINE 1 PATCH: 14 PATCH, EXTENDED RELEASE TRANSDERMAL at 20:39

## 2022-01-23 RX ADMIN — HYDROMORPHONE HYDROCHLORIDE 1 MG: 1 INJECTION, SOLUTION INTRAMUSCULAR; INTRAVENOUS; SUBCUTANEOUS at 22:17

## 2022-01-23 RX ADMIN — HYDROMORPHONE HYDROCHLORIDE 0.5 MG: 0.2 INJECTION, SOLUTION INTRAMUSCULAR; INTRAVENOUS; SUBCUTANEOUS at 02:28

## 2022-01-23 RX ADMIN — HYDROMORPHONE HYDROCHLORIDE 0.5 MG: 0.2 INJECTION, SOLUTION INTRAMUSCULAR; INTRAVENOUS; SUBCUTANEOUS at 06:27

## 2022-01-23 RX ADMIN — CLONIDINE HYDROCHLORIDE 0.1 MG: 0.1 TABLET ORAL at 08:47

## 2022-01-23 RX ADMIN — Medication: at 22:18

## 2022-01-23 RX ADMIN — CLONIDINE HYDROCHLORIDE 0.1 MG: 0.1 TABLET ORAL at 16:00

## 2022-01-23 RX ADMIN — FOLIC ACID 1 MG: 1 TABLET ORAL at 08:47

## 2022-01-23 RX ADMIN — HYDROMORPHONE HYDROCHLORIDE 1 MG: 1 INJECTION, SOLUTION INTRAMUSCULAR; INTRAVENOUS; SUBCUTANEOUS at 19:17

## 2022-01-23 RX ADMIN — DIAZEPAM 10 MG: 10 TABLET ORAL at 20:35

## 2022-01-23 RX ADMIN — HYDROMORPHONE HYDROCHLORIDE 0.5 MG: 0.2 INJECTION, SOLUTION INTRAMUSCULAR; INTRAVENOUS; SUBCUTANEOUS at 00:26

## 2022-01-23 RX ADMIN — HYDROMORPHONE HYDROCHLORIDE 0.5 MG: 0.2 INJECTION, SOLUTION INTRAMUSCULAR; INTRAVENOUS; SUBCUTANEOUS at 11:13

## 2022-01-23 RX ADMIN — ACETAMINOPHEN 650 MG: 325 TABLET ORAL at 02:01

## 2022-01-23 RX ADMIN — DIAZEPAM 10 MG: 10 TABLET ORAL at 09:35

## 2022-01-23 RX ADMIN — DIAZEPAM 10 MG: 10 TABLET ORAL at 16:00

## 2022-01-23 RX ADMIN — POTASSIUM CHLORIDE 40 MEQ: 1500 TABLET, EXTENDED RELEASE ORAL at 13:20

## 2022-01-23 ASSESSMENT — ACTIVITIES OF DAILY LIVING (ADL)
ADLS_ACUITY_SCORE: 3

## 2022-01-23 NOTE — PLAN OF CARE
Patient remains NPO. IVF infusing per orders. Patient appears calm in room but states anxiety is high and requesting valium multiple times. CIWA scoring 8. Valium given x2 per protocol. Patient c/o increased pain tonight and requesting dilaudid more frequently, MD paged and dose and frequency changed. Dilaudid given x3 tonight. Pain rating up to 10/10. Ice applied to abdomen.    Clare Bocanegra RN

## 2022-01-23 NOTE — PLAN OF CARE
PRIMARY DIAGNOSIS: ACUTE PAIN  OUTPATIENT/OBSERVATION GOALS TO BE MET BEFORE DISCHARGE:  1. Pain Status: Improved-controlled with oral pain medications.    2. Return to near baseline physical activity: Yes    3. Cleared for discharge by consultants (if involved): No    Discharge Planner Nurse   Safe discharge environment identified: Yes  Barriers to discharge: Yes       Entered by: Claude Styles 01/23/2022 12:02 AM     Please review provider order for any additional goals.   Nurse to notify provider when observation goals have been met and patient is ready for discharge.

## 2022-01-23 NOTE — CONSULTS
[facility] ACUTE PAIN SERVICE    ([facility], [facility], [facility])   Consult Note    Date of Admission:  1/21/2022  Date of Consult: 01/23/22    Physician requesting consult: Heather López MD   Reason for consult: pain management     Assessment/Plan:     Javier Rivas is a 26 year old male who was admitted on 1/21/2022.   Pain Service is asked to see the patient for pain management. Admitted for evaluation of abdominal pian. History of alcohol dependence, duodenitis.  Pain began about  6 hours prior to coming into hospital.  Patient with alcohol mohinder disorder previous withdrawal symptoms no seizures.     [facility] Consult:   The patient doesn't smoke tobacco does vape, and regular marijuana use.   He denies chronic abdominal pain.    Imaging:  Edema of the pancreatic head wall thickening of adjacent duodenum not excluded, fatty liver.  Recommend: smoking and alcohol cessation, IVF, Pain Control, NPO    Patient is on CIWA protocol.      Stating the IV hydromorphone at current dose is last only about 1 1/2 hours.    Will adjust dose to 1 mg every three hours.    Discussed severity of his condition.  He has had 8 episodes over the past 8 months.    He is agreeable to having someone come by and discuss treatment options.  Rule 25 assessment placed.      PLAN:   1) Pain is consistent with acute abdominal pain associated with pancreatitis.     2)Multimodal Medication Therapy  Topical: none for now  NSAID: consider toradol if okay with GI  Muscle Relaxants: none  Adjuvants: gabapentin per CIWA for now  Antidepressants/anxiolytics: valium per CIWA  Opioids: will hold until off IV  IV Pain medication:  Hydromorphone 1 mg every three hours prn over night tonight will reassess in AM  3)Non-medication interventions  Pharmacy consult- appreciate recommendations   Acupuncture consult- as available Mon and Thursday    Integrative consult - called referral to [phone]   4)Constipation Prophylaxis  Daily stool  softener/laxative as needed  5) Follow up   -Opioid prescriber has been none  -Discharge Recommendations - We recommend prescribing the following at the time of discharge: TBD  Oxycodone 5 mg tablets #12 tablets          History of Present Illness (HPI):       Javier Rivas is a 26 year old old male with acute pancreatitis.  Patient states he had first bout of pancreatitis 8 months ago.  He does have history of alcohol abuse disorder.  He states he has been in treatment different times since he was 18 years old.  He states he does want to quit drinking and is open to looking into treatment.      Patient moved back to [place] after this past year.  He had been living in [place] for 7 years previously.    [place]  pulled from system on 01/23/22. Last refill on 12/31/21 oxycodone 10 tablets for 2 days, 12/30 10 tablets for 1 day  Ambien 10 mg two prescriptions 12/10/21 and 12/29/21   5 mg 11/30/21.     Medical History  Patient Active Problem List    Diagnosis Date Noted     Acute pancreatitis 01/22/2022     Priority: Medium     Nausea 01/22/2022     Priority: Medium     Pancreatitis 01/22/2022     Priority: Medium     Duodenitis 01/22/2022     Priority: Medium     Alcohol dependence with uncomplicated intoxication (H) 01/22/2022     Priority: Medium     LUQ abdominal pain 01/22/2022     Priority: Medium     Major depression 10/19/2015     Priority: Medium        Surgical History  He  has no past surgical history on file.   History reviewed. No pertinent surgical history.    Allergies  No Known Allergies    Prior to Admission Medications   Medications Prior to Admission   Medication Sig Dispense Refill Last Dose     cloNIDine (CATAPRES) 0.1 MG tablet Take 0.1 mg by mouth 2 times daily as needed   Past Week at prn     fexofenadine-pseudoePHEDrine (ALLEGRA-D 24) 180-240 MG per tablet Take 1 tablet by mouth daily as needed    Past Month at prn     hydrOXYzine (ATARAX) 25 MG tablet Take 25 mg by mouth 4 times daily as needed   Past  Week at prn     ondansetron (ZOFRAN-ODT) 4 MG ODT tab Place 4 mg under the tongue every 8 hours as needed   Past Week at prn     pantoprazole (PROTONIX) 40 MG EC tablet Take 40 mg by mouth daily   1/20/2022 at Unknown time     traZODone (DESYREL) 50 MG tablet Take 50 mg by mouth nightly as needed   1/20/2022 at Unknown time     zolpidem (AMBIEN) 10 MG tablet Take 10 mg by mouth nightly as needed   Past Week at prn       Social History  Reviewed, and he  reports that he has never smoked. He uses smokeless tobacco. He reports current alcohol use. He reports current drug use. Drug: Marijuana.  Social History     Tobacco Use     Smoking status: Never Smoker     Smokeless tobacco: Current User   Substance Use Topics     Alcohol use: Yes     Comment: occ.       Family History  Reviewed, and family history is not on file.    Review of Systems  Complete ROS reviewed, unless noted, all other systems reviewed and found to be negative.        Objective:     Physical Exam:  BP (!) 164/97 (BP Location: Right arm, Patient Position: Supine)   Pulse 105   Temp 97.8  F (36.6  C)   Resp 16   Ht 1.829 m (6')   Wt 75.4 kg (166 lb 3.2 oz)   SpO2 100%   BMI 22.54 kg/m    Weight:   Weight change: 2.812 kg (6 lb 3.2 oz)  Body mass index is 22.54 kg/m .      General Appearance:  Alert, cooperative, sitting up in bed, anxious   Head:  Normocephalic, without obvious abnormality, atraumatic   Eyes:  PERRL, conjunctiva/corneas clear, EOM's intact   ENT/Throat: Lips, mucosa, and tongue normal; teeth and gums normal   Lymph/Neck: Supple, symmetrical, trachea midline   Lungs:   respirations unlabored   Chest Wall:  No tenderness or deformity   Cardiovascular/Heart:  Regular rate and rhythm Edema: none   Abdomen:   Soft, tender, bowel sounds    Musculoskeletal: Spine and extremities normal, atraumatic, range of motion,    Skin: Skin color, texture, turgor normal, no rashes or lesions   Neurologic: Reflexes intact, coordinated movement  Alert  and oriented X 3, Moves all 4 extremities       Psych: Affect is limited range, pain focused            Imaging Reviewed  CT Abdomen Pelvis w Contrast    Result Date: 1/21/2022  EXAM: CT ABDOMEN PELVIS W CONTRAST LOCATION: [facility] DATE/TIME: 1/21/2022 10:35 PM INDICATION: Left lower quadrant pain. COMPARISON: 12/30/2021 TECHNIQUE: CT scan of the abdomen and pelvis was performed following injection of IV contrast. Multiplanar reformats were obtained. Dose reduction techniques were used. CONTRAST: isovue 370 100ml FINDINGS: LOWER CHEST: Lung bases clear. HEPATOBILIARY: Hepatic steatosis. PANCREAS: Edema of the pancreatic head wall thickening of adjacent duodenum not excluded. SPLEEN: Normal. ADRENAL GLANDS: Normal. KIDNEYS/BLADDER: Subcentimeter renal hypodensities small for characterization. Nonobstructing right renal calculus. BOWEL: Underdistention of the colon reduces evaluation for wall thickening/colitis. The appendix is normal. Bowel is normal caliber. LYMPH NODES: Normal. VASCULATURE: Unremarkable. PELVIC ORGANS: Prostate calcification. MUSCULOSKELETAL: Normal.     IMPRESSION: 1.  Edema of the pancreatic head and wall thickening of the adjacent duodenum not excluded. Correlate clinically for pancreatitis versus duodenitis. 2.  Colon is underdistended reducing evaluation for wall thickening/early changes of colitis. 3.  Nonobstructing right renal calculus. 4.  Fatty liver. 5.  Normal appendix.      Labs Reviewed Personally By Myself  Results for orders placed or performed during the hospital encounter of 01/21/22   CT Abdomen Pelvis w Contrast     Status: None    Narrative    EXAM: CT ABDOMEN PELVIS W CONTRAST  LOCATION: [facility]  DATE/TIME: 1/21/2022 10:35 PM    INDICATION: Left lower quadrant pain.  COMPARISON: 12/30/2021  TECHNIQUE: CT scan of the abdomen and pelvis was performed following injection of IV contrast. Multiplanar reformats were obtained.  Dose reduction techniques were used.  CONTRAST: isovue 370 100ml    FINDINGS:   LOWER CHEST: Lung bases clear.    HEPATOBILIARY: Hepatic steatosis.    PANCREAS: Edema of the pancreatic head wall thickening of adjacent duodenum not excluded.    SPLEEN: Normal.    ADRENAL GLANDS: Normal.    KIDNEYS/BLADDER: Subcentimeter renal hypodensities small for characterization. Nonobstructing right renal calculus.    BOWEL: Underdistention of the colon reduces evaluation for wall thickening/colitis. The appendix is normal. Bowel is normal caliber.    LYMPH NODES: Normal.    VASCULATURE: Unremarkable.    PELVIC ORGANS: Prostate calcification.    MUSCULOSKELETAL: Normal.      Impression    IMPRESSION:   1.  Edema of the pancreatic head and wall thickening of the adjacent duodenum not excluded. Correlate clinically for pancreatitis versus duodenitis.  2.  Colon is underdistended reducing evaluation for wall thickening/early changes of colitis.  3.  Nonobstructing right renal calculus.  4.  Fatty liver.  5.  Normal appendix.   Basic metabolic panel     Status: Normal   Result Value Ref Range    Sodium 144 136 - 145 mmol/L    Potassium 3.9 3.5 - 5.0 mmol/L    Chloride 104 98 - 107 mmol/L    Carbon Dioxide (CO2) 25 22 - 31 mmol/L    Anion Gap 15 5 - 18 mmol/L    Urea Nitrogen 11 8 - 22 mg/dL    Creatinine 0.80 0.70 - 1.30 mg/dL    Calcium 9.2 8.5 - 10.5 mg/dL    Glucose 94 70 - 125 mg/dL    GFR Estimate >90 >60 mL/min/1.73m2   Hepatic function panel     Status: Abnormal   Result Value Ref Range    Bilirubin Total 0.4 0.0 - 1.0 mg/dL    Bilirubin Direct 0.1 <=0.5 mg/dL    Protein Total 8.2 (H) 6.0 - 8.0 g/dL    Albumin 4.6 3.5 - 5.0 g/dL    Alkaline Phosphatase 58 45 - 120 U/L    AST 39 0 - 40 U/L    ALT 39 0 - 45 U/L   Lipase     Status: Abnormal   Result Value Ref Range    Lipase 70 (H) 0 - 52 U/L   CBC with platelets and differential     Status: None   Result Value Ref Range    WBC Count 6.8 4.0 - 11.0 10e3/uL    RBC Count 5.28  4.40 - 5.90 10e6/uL    Hemoglobin 15.9 13.3 - 17.7 g/dL    Hematocrit 47.3 40.0 - 53.0 %    MCV 90 78 - 100 fL    MCH 30.1 26.5 - 33.0 pg    MCHC 33.6 31.5 - 36.5 g/dL    RDW 13.4 10.0 - 15.0 %    Platelet Count 289 150 - 450 10e3/uL    % Neutrophils 52 %    % Lymphocytes 37 %    % Monocytes 9 %    % Eosinophils 1 %    % Basophils 1 %    % Immature Granulocytes 0 %    NRBCs per 100 WBC 0 <1 /100    Absolute Neutrophils 3.6 1.6 - 8.3 10e3/uL    Absolute Lymphocytes 2.5 0.8 - 5.3 10e3/uL    Absolute Monocytes 0.6 0.0 - 1.3 10e3/uL    Absolute Eosinophils 0.0 0.0 - 0.7 10e3/uL    Absolute Basophils 0.1 0.0 - 0.2 10e3/uL    Absolute Immature Granulocytes 0.0 <=0.4 10e3/uL    Absolute NRBCs 0.0 10e3/uL   UA with Microscopic reflex to Culture     Status: Abnormal    Specimen: Urine, Midstream   Result Value Ref Range    Color Urine Light Yellow Colorless, Straw, Light Yellow, Yellow    Appearance Urine Clear Clear    Glucose Urine Negative Negative mg/dL    Bilirubin Urine Negative Negative    Ketones Urine Negative Negative mg/dL    Specific Gravity Urine 1.017 1.001 - 1.030    Blood Urine Negative Negative    pH Urine 6.0 5.0 - 7.0    Protein Albumin Urine 10  (A) Negative mg/dL    Urobilinogen Urine <2.0 <2.0 mg/dL    Nitrite Urine Negative Negative    Leukocyte Esterase Urine Negative Negative    Mucus Urine Present (A) None Seen /LPF    RBC Urine 0 <=2 /HPF    WBC Urine 0 <=5 /HPF    Narrative    Urine Culture not indicated   Troponin I (now)     Status: Normal   Result Value Ref Range    Troponin I <0.01 0.00 - 0.29 ng/mL   Alcohol level blood     Status: Abnormal   Result Value Ref Range    Alcohol, Blood 363 (H) None detected mg/dL   Asymptomatic COVID-19 Virus (Coronavirus) by PCR Nasopharyngeal     Status: Normal    Specimen: Nasopharyngeal; Swab   Result Value Ref Range    SARS CoV2 PCR Negative Negative    Narrative    Testing was performed using the chloe  SARS-CoV-2 & Influenza A/B Assay on the chloe  Ruby   System.  This test should be ordered for the detection of SARS-COV-2 in individuals who meet SARS-CoV-2 clinical and/or epidemiological criteria. Test performance is unknown in asymptomatic patients.  This test is for in vitro diagnostic use under the FDA EUA for laboratories certified under CLIA to perform moderate and/or high complexity testing. This test has not been FDA cleared or approved.  A negative test does not rule out the presence of PCR inhibitors in the specimen or target RNA in concentration below the limit of detection for the assay. The possibility of a false negative should be considered if the patient's recent exposure or clinical presentation suggests COVID-19.  [facility] are certified under the Clinical Laboratory Improvement Amendments of 1988 (CLIA-88) as qualified to perform moderate and/or high complexity laboratory testing.   Magnesium     Status: Normal   Result Value Ref Range    Magnesium 1.9 1.8 - 2.6 mg/dL   Phosphorus     Status: Normal   Result Value Ref Range    Phosphorus 3.3 2.5 - 4.5 mg/dL   Comprehensive metabolic panel     Status: Abnormal   Result Value Ref Range    Sodium 135 (L) 136 - 145 mmol/L    Potassium 3.4 (L) 3.5 - 5.0 mmol/L    Chloride 99 98 - 107 mmol/L    Carbon Dioxide (CO2) 24 22 - 31 mmol/L    Anion Gap 12 5 - 18 mmol/L    Urea Nitrogen 3 (L) 8 - 22 mg/dL    Creatinine 0.69 (L) 0.70 - 1.30 mg/dL    Calcium 8.6 8.5 - 10.5 mg/dL    Glucose 69 (L) 70 - 125 mg/dL    Alkaline Phosphatase 46 45 - 120 U/L    AST 32 0 - 40 U/L    ALT 25 0 - 45 U/L    Protein Total 6.3 6.0 - 8.0 g/dL    Albumin 3.6 3.5 - 5.0 g/dL    Bilirubin Total 0.9 0.0 - 1.0 mg/dL    GFR Estimate >90 >60 mL/min/1.73m2   CBC with platelets and differential     Status: Abnormal   Result Value Ref Range    WBC Count 5.0 4.0 - 11.0 10e3/uL    RBC Count 4.37 (L) 4.40 - 5.90 10e6/uL    Hemoglobin 13.2 (L) 13.3 - 17.7 g/dL    Hematocrit 39.9 (L) 40.0 - 53.0 %    MCV 91 78 - 100 fL     MCH 30.2 26.5 - 33.0 pg    MCHC 33.1 31.5 - 36.5 g/dL    RDW 13.1 10.0 - 15.0 %    Platelet Count 171 150 - 450 10e3/uL    % Neutrophils 75 %    % Lymphocytes 10 %    % Monocytes 14 %    % Eosinophils 1 %    % Basophils 0 %    % Immature Granulocytes 0 %    NRBCs per 100 WBC 0 <1 /100    Absolute Neutrophils 3.7 1.6 - 8.3 10e3/uL    Absolute Lymphocytes 0.5 (L) 0.8 - 5.3 10e3/uL    Absolute Monocytes 0.7 0.0 - 1.3 10e3/uL    Absolute Eosinophils 0.1 0.0 - 0.7 10e3/uL    Absolute Basophils 0.0 0.0 - 0.2 10e3/uL    Absolute Immature Granulocytes 0.0 <=0.4 10e3/uL    Absolute NRBCs 0.0 10e3/uL   Lipase     Status: Abnormal   Result Value Ref Range    Lipase 360 (H) 0 - 52 U/L   ECG 12-LEAD WITH MUSE (LHE)     Status: None   Result Value Ref Range    Systolic Blood Pressure  mmHg    Diastolic Blood Pressure  mmHg    Ventricular Rate 107 BPM    Atrial Rate 107 BPM    AZ Interval 140 ms    QRS Duration 88 ms     ms    QTc 443 ms    P Axis 89 degrees    R AXIS 67 degrees    T Axis 78 degrees    Interpretation ECG       Sinus tachycardia  Possible Lateral infarct , age undetermined  Abnormal ECG  No previous ECGs available  Confirmed by SEE ED PROVIDER NOTE FOR, ECG INTERPRETATION ([id number]),  TAMICA YOO ([id number]) on 1/22/2022 9:22:07 AM     Gastroenterology IP Consult: Acute pancreatitis; Consultant may enter orders: Yes; Patient to be seen: Routine - within 24 hours; Requesting provider? Hospitalist (if different from attending physician)     Status: None ()    Flaco Pierre MD     1/22/2022 11:59 AM                                       CONSULTING PHYSICIAN   Thony Pizarro MD     REASON FOR CONSULTATION   abdominal pain, pancreatitis      CHIEF COMPLAINT   Javier Rivas came to the hospital for evaluation of abdominal   pain      HISTORY OF PRESENT ILLNESS   Javier Rivas is a 26 year old male with hx of pancreatitis   admitted with abdominal pain    Patient reports a hx of ETOH abuse,  last drink day of admission   (), hx of pancreatitis admitted with abdominal pain    Patient notes onset of abdominal pain yesterday with radiation to   his back. Mid abd in location. Similar to previous episodes of   pancreatitis    Patient reports 8-10 episodes of pancreatitis in the past.     ETOH abuse hx. Does not smoke but does vape multiple times daily.   Uses marijuana.     CT with pancreatitis without fluid collection   Lipase 70    In between episodes, he denies chronic abd pain. Denies   steatorrhea. No hx of elevated blood glucose.     PAST HISTORY   Past Medical History:   Diagnosis Date     Allergic state      Anxiety      Depressive disorder       History reviewed. No pertinent surgical history.     Family History Social History   History reviewed. No pertinent family history. Social History     Tobacco Use     Smoking status: Never Smoker     Smokeless tobacco: Current User   Substance Use Topics     Alcohol use: Yes     Comment: occ.     Drug use: Yes     Types: Marijuana          MEDICATIONS & ALLERGIES   (Not in a hospital admission)       ALLERGIES   No Known Allergies      REVIEW OF SYSTEMS   A comprehensive review of systems was performed and was otherwise   noncontributory.     OBJECTIVE   Vitals Blood pressure 129/72, pulse 96, temperature 98  F (36.7    C), temperature source Oral, resp. rate 20, height 1.829 m (6'),   weight 72.6 kg (160 lb), SpO2 95 %.           Physical  Exam   GENERAL: alert and oriented, well nourished in   no apparent distress    SKIN: warm and dry, no rashes    HEENT: atraumatic, anicteric, moist mucous membranes, neck   soft/supple     PULMONARY: normal resp effort, breath sounds clear to   auscultation bilateral    CARDIOVASCULAR: normal rate and rhythm, no murmurs, no edema    ABDOMEN: tenderness in mid abd no distention, bowel sounds   normal    MUSCULOSKELETAL: joints and gait normal    NEUROLOGICAL: appropriate mental status, grossly intact  DERM: no  rash, no jaundice    PSYCHIATRIC: normal mood, affect and insight        LABORATORY    ELECTROLYTE PANEL   Recent Labs   Lab 01/21/22 2141      POTASSIUM 3.9   CHLORIDE 104   CO2 25   GLC 94   CR 0.80   BUN 11      HEMATOLOGY PANEL   Recent Labs   Lab 01/21/22 2141   HGB 15.9   MCV 90   WBC 6.8         LIVER AND PANCREAS PANEL   Recent Labs   Lab 01/21/22 2141   AST 39   ALT 39   ALKPHOS 58   BILITOTAL 0.4   LIPASE 70*     IMAGING STUDIES    EXAM: CT ABDOMEN PELVIS W CONTRAST  LOCATION: [facility]  DATE/TIME: 1/21/2022 10:35 PM     INDICATION: Left lower quadrant pain.  COMPARISON: 12/30/2021  TECHNIQUE: CT scan of the abdomen and pelvis was performed   following injection of IV contrast. Multiplanar reformats were   obtained. Dose reduction techniques were used.  CONTRAST: isovue 370 100ml     FINDINGS:   LOWER CHEST: Lung bases clear.     HEPATOBILIARY: Hepatic steatosis.     PANCREAS: Edema of the pancreatic head wall thickening of   adjacent duodenum not excluded.     SPLEEN: Normal.     ADRENAL GLANDS: Normal.     KIDNEYS/BLADDER: Subcentimeter renal hypodensities small for   characterization. Nonobstructing right renal calculus.     BOWEL: Underdistention of the colon reduces evaluation for wall   thickening/colitis. The appendix is normal. Bowel is normal   caliber.     LYMPH NODES: Normal.     VASCULATURE: Unremarkable.     PELVIC ORGANS: Prostate calcification.     MUSCULOSKELETAL: Normal.                                                                      IMPRESSION:   1.  Edema of the pancreatic head and wall thickening of the   adjacent duodenum not excluded. Correlate clinically for   pancreatitis versus duodenitis.  2.  Colon is underdistended reducing evaluation for wall   thickening/early changes of colitis.  3.  Nonobstructing right renal calculus.  4.  Fatty liver.  5.  Normal appendix.EXAM: CT ABDOMEN PELVIS W CONTRAST  LOCATION: [facility]  DATE/TIME: 1/21/2022 10:35 PM     INDICATION: Left lower quadrant pain.  COMPARISON: 12/30/2021  TECHNIQUE: CT scan of the abdomen and pelvis was performed   following injection of IV contrast. Multiplanar reformats were   obtained. Dose reduction techniques were used.  CONTRAST: isovue 370 100ml     FINDINGS:   LOWER CHEST: Lung bases clear.     HEPATOBILIARY: Hepatic steatosis.     PANCREAS: Edema of the pancreatic head wall thickening of   adjacent duodenum not excluded.     SPLEEN: Normal.     ADRENAL GLANDS: Normal.     KIDNEYS/BLADDER: Subcentimeter renal hypodensities small for   characterization. Nonobstructing right renal calculus.     BOWEL: Underdistention of the colon reduces evaluation for wall   thickening/colitis. The appendix is normal. Bowel is normal   caliber.     LYMPH NODES: Normal.     VASCULATURE: Unremarkable.     PELVIC ORGANS: Prostate calcification.     MUSCULOSKELETAL: Normal.                                                                      IMPRESSION:   1.  Edema of the pancreatic head and wall thickening of the   adjacent duodenum not excluded. Correlate clinically for   pancreatitis versus duodenitis.  2.  Colon is underdistended reducing evaluation for wall   thickening/early changes of colitis.  3.  Nonobstructing right renal calculus.  4.  Fatty liver.  5.  Normal appendix.    I have reviewed the current diagnostic and laboratory tests.           IMPRESSION   Javier Rivas is a 26 year old male with recurrent pancreatitis   in setting of chronic ETOH and tobacco abuse    Pancreatitis- recurrent. No evidence of calcification on imaging   but at risk for chronic pancreatitis.   Discussed ETOH and tobacco cessation  IVF  Pain control  NPO until off IV narcotics.              Flaco Roman MD  Thank you for the opportunity to participate in the care of this   patient.   Please feel free to call me with any questions or concerns.  Phone number [phone].          "  Social Work/ Care Management IP Consult     Status: None ()    Narrative    Ann Tiwari RN     1/22/2022  1:19 PM  Care Management Initial Consult    General Information  Assessment completed with: PatientJavier  Type of CM/SW Visit: Initial Assessment    Primary Care Provider verified and updated as needed: Yes   Readmission within the last 30 days: no previous admission in   last 30 days      Reason for Consult: discharge planning  Advance Care Planning: Advance Care Planning Reviewed: other   (comment) (\"I don't have one\")          Communication Assessment  Patient's communication style: spoken language (English or   Bilingual)             Cognitive  Cognitive/Neuro/Behavioral: WDL                      Living Environment:   People in home: grandparent(s)     Current living Arrangements:  (\"town house\")      Able to return to prior arrangements: yes       Family/Social Support:  Care provided by: self  Provides care for: no one, unable/limited ability to care for   self     Grandparent(s)          Description of Support System: Supportive,Involved    Support Assessment: Adequate family and caregiver   support,Adequate social supports,Patient communicates needs well   met    Current Resources:   Patient receiving home care services: No     Community Resources: None  Equipment currently used at home: none  Supplies currently used at home: Other (\"blue light glasses for   the computer and I use it a lot\")    Employment/Financial:  Employment Status: employed full-time     Employment/ Comments: \"no  history\"  Financial Concerns: insurance, none (\"no insurance until 2/1/22   when I start [employer] insurance\")   Referral to Financial Counselor: Yes       Lifestyle & Psychosocial Needs:  Social Determinants of Health     Tobacco Use: High Risk     Smoking Tobacco Use: Never Smoker     Smokeless Tobacco Use: Current User   Alcohol Use: Not on file   Financial Resource Strain: Not on file   Food " "Insecurity: Not on file   Transportation Needs: Not on file   Physical Activity: Not on file   Stress: Not on file   Social Connections: Not on file   Intimate Partner Violence: Not on file   Depression: Not on file   Housing Stability: Not on file       Functional Status:  Prior to admission patient needed assistance:   Dependent ADLs:: Independent,Ambulation-no assistive device  Dependent IADLs:: Transportation (\"I don't drive now\")  Assesssment of Functional Status: At functional baseline    Mental Health Status:          Chemical Dependency Status:  Chemical Dependency Status: Current Concern             Values/Beliefs:  Spiritual, Cultural Beliefs, Jew Practices, Values that   affect care:                 Additional Information:  Javier lives in a town house with his grandparents. He is   independent with ADLs at baseline. He states, \"I work from home   now\".    May need CD resources before discharge.    He states, \"I have no insurance right now, and I start with   [employer] insurance on 2/1/22.\"    Family to transport at discharge.    Ann Tiwari RN       CBC with Platelets & Differential     Status: None    Narrative    The following orders were created for panel order CBC with Platelets & Differential.  Procedure                               Abnormality         Status                     ---------                               -----------         ------                     CBC with platelets and d...[[id number]]                      Final result                 Please view results for these tests on the individual orders.   CBC with platelets differential     Status: Abnormal    Narrative    The following orders were created for panel order CBC with platelets differential.  Procedure                               Abnormality         Status                     ---------                               -----------         ------                     CBC with platelets and d...[[id number]]  Abnormal           "  Final result                 Please view results for these tests on the individual orders.          Total time spent 68 minutes with greater than 50% in consultation, education and coordination of care.     Also discussed with RN    Thank you for this consultation.      Jailyn DEY, CNS-BC, DNP  Acute Care Pain Management Program  [facility] ([place], [place])   With questions call [phone]  Preference if for [facility] - Fabio  Click HERE to page Yulissa

## 2022-01-23 NOTE — PROGRESS NOTES
GASTROENTEROLOGY PROGRESS NOTE     SUBJECTIVE   Continues to note pain. States his level of pain medication is inadequate     OBJECTIVE     Vitals Blood pressure 138/87, pulse 103, temperature 97.7  F (36.5  C), temperature source Oral, resp. rate 19, height 1.829 m (6'), weight 75.4 kg (166 lb 3.2 oz), SpO2 98 %.          Physical Exam   General: awake, alert, oriented times three    Cardiovascular: RRR, no edema    Chest: lungs are clear to auscultation bilaterally    Abdomen: soft, TTP in LUQ, non-distended, bowel sounds present    Neurologic: grossly intact        LABORATORY    ELECTROLYTE PANEL   Recent Labs   Lab 01/21/22 2141      POTASSIUM 3.9   CHLORIDE 104   CO2 25   GLC 94   CR 0.80   BUN 11      HEMATOLOGY PANEL   Recent Labs   Lab 01/21/22 2141   HGB 15.9   MCV 90   WBC 6.8         LIVER AND PANCREAS PANEL   Recent Labs   Lab 01/21/22 2141   AST 39   ALT 39   ALKPHOS 58   BILITOTAL 0.4   LIPASE 70*     IMAGING STUDIES        I have reviewed the current diagnostic and laboratory tests.              IMPRESSION   Javier Rivas is a 26 year old male with recurrent pancreatitis in setting of chronic ETOH and tobacco abuse     Pancreatitis- recurrent.   No evidence of calcification on imaging but at risk for chronic pancreatitis.   Discussed ETOH and tobacco cessation  IVF  Pain control- may benefit from a Pain management consult given his multiple hospitalizations.   NPO until off IV narcotics.              Flaco Roman MD  Thank you for the opportunity to participate in the care of this patient.   Please feel free to call me with any questions or concerns.  Phone number [phone].

## 2022-01-23 NOTE — UTILIZATION REVIEW
"Admission Status; Secondary Review Determination       Under the authority of the Utilization Management Committee, the utilization review process indicated a secondary review on the above patient. The review outcome is based on review of the medical records, discussions with staff, and applying clinical experience noted on the date of the review.     (x) Inpatient Status Appropriate - This patient's medical care is consistent with medical management for inpatient care and reasonable inpatient medical practice.     RATIONALE FOR DETERMINATION   At the time of admission with the information available to the attending physician more than 2 nights Hospital complex care was anticipated, based on patient risk of adverse outcome if treated as outpatient and complex care required. Inpatient admission is appropriate based on the Medicare guidelines.     SUMMARY:  27 y/o male with polysubstance abuse history presented acutely intoxicated with abdominal discomfort.  Imaging consistent with acute pancreatitis.  He is still NPO due to ongoing GI/abdominal complaints and requires ongoing hospital care for treatment.  He is not expected to discharge for a \"few days\" due to the ongoing significant GI/ABD complaints and inability to advance diet thus far.    The information on this document is developed by the utilization review team in order for the business office to ensure compliance. This only denotes the appropriateness of proper admission status and does not reflect the quality of care rendered.   The definitions of Inpatient Status and Observation Status used in making the determination above are those provided in the CMS Coverage Manual, Chapter 1 and Chapter 6, section 70.4.     Sincerely,     Noé Garcia DO, [facility]  Utilization Review  Physician Advisor    "

## 2022-01-23 NOTE — PROGRESS NOTES
Hospitalist Progress Note    Assessment/Plan    Active Problems:    Acute pancreatitis    Nausea    Pancreatitis    Duodenitis    Alcohol dependence with uncomplicated intoxication (H)    LUQ abdominal pain    26-year-old patient with history of polysubstance abuse (alcohol, cannabis). History of bipolar and panic disorder, presented to the hospital with the left upper quadrant pain after drinking alcohol found to have acute pancreatitis     Abdominal pain due to acute pancreatitis  Admitted to the hospital on January 21, CT abdomen showed edema at the pancreatic head adjacent duodenitis cannot be excluded  Slightly elevated lipase at 70,  Continue to have severe pain discussed with him about cutting back on his pain medications and he does not feel comfortable with that  We will have pain management team evaluate him  GI is following, continue to be n.p.o., on IV fluids     Alcohol intoxication  Alcohol level was 363 on arrival at risk of withdrawal,  CIWA protocol initiated, will monitor so far no evidence of withdrawal but very anxious        Renal calculus  Incidental finding on CT abdomen nonobstructive right renal calculus,  Follow-up as an outpatient     Tobacco abuse  Nicotine patch ordered    Barriers to Discharge: Pain control, n.p.o. status,    Anticipated discharge date/Disposition: Home in a few days    Subjective  Patient was seen today he states that he still have a lot of pain and he request to increase the dose of his pain medication I explained to him that is every 2 hours,     Objective    Vital signs in last 24 hours  Temp:  [97.7  F (36.5  C)-98.9  F (37.2  C)] 97.8  F (36.6  C)  Pulse:  [] 105  Resp:  [16-24] 16  BP: (131-164)/() 164/97  SpO2:  [95 %-100 %] 100 % [unfilled] O2 Device: None (Room air)    Weight:   [unfilled] Weight change: 2.812 kg (6 lb 3.2 oz)    Intake/Output last 3 shifts  I/O last 3 completed shifts:  In: 1588 [P.O.:50; I.V.:1538]  Out: 1675 [Urine:1675]  Body  mass index is 22.54 kg/m .    Physical Exam:  General Appearance: Alert, oriented x3, does not appear in distress.  HEENT: Normocephalic. No scleral icterus, . Mucous membranes moist.  Heart: :Regular rate and rhythm, normal S1 ,S2, No murmurs, no JVD, no pedal edema   Lungs: Clear to auscultation bilaterally. No wheezing or crackles  Abdomen: Soft, LUQ  tender, no rebound or rigidity, non distended, bowel sounds present.      Pertinent Labs   Lab Results: personally reviewed.   Recent Labs   Lab 01/23/22  1018 01/21/22  2141   * 144   CO2 24 25   BUN 3* 11   ALBUMIN 3.6 4.6   ALKPHOS 46 58   ALT 25 39   AST 32 39     Recent Labs   Lab 01/23/22  1018 01/21/22 2141   WBC 5.0 6.8   HGB 13.2* 15.9   HCT 39.9* 47.3    289     Recent Labs   Lab 01/21/22 2141   TROPONINI <0.01     Invalid input(s): POCGLUFGR              Advanced Care Planning:  Discharge Planning discussed with patient  Total time with this patient is 25 min with 50% of time spent in examining the patient, reviewing records, discussing plan of care and counseling, 50% of time spent in coordination of care.  Care discussed and coordinated with JO ANN.      Heather López MD  Internal Medicine Hospitalist  1/23/2022

## 2022-01-23 NOTE — PLAN OF CARE
PRIMARY DIAGNOSIS: ACUTE PAIN  OUTPATIENT/OBSERVATION GOALS TO BE MET BEFORE DISCHARGE:  1. Pain Status: Improved-controlled with oral pain medications.    2. Return to near baseline physical activity: Yes    3. Cleared for discharge by consultants (if involved): No    Discharge Planner Nurse   Safe discharge environment identified: Yes  Barriers to discharge: Yes       Entered by: Claude Styles 01/23/2022 5:39 AM     Please review provider order for any additional goals.   Nurse to notify provider when observation goals have been met and patient is ready for discharge.

## 2022-01-24 LAB
HOLD SPECIMEN: NORMAL
POTASSIUM BLD-SCNC: 3.5 MMOL/L (ref 3.5–5)

## 2022-01-24 PROCEDURE — 36415 COLL VENOUS BLD VENIPUNCTURE: CPT | Performed by: HOSPITALIST

## 2022-01-24 PROCEDURE — C9113 INJ PANTOPRAZOLE SODIUM, VIA: HCPCS | Performed by: STUDENT IN AN ORGANIZED HEALTH CARE EDUCATION/TRAINING PROGRAM

## 2022-01-24 PROCEDURE — 258N000003 HC RX IP 258 OP 636: Performed by: HOSPITALIST

## 2022-01-24 PROCEDURE — 250N000013 HC RX MED GY IP 250 OP 250 PS 637: Performed by: CLINICAL NURSE SPECIALIST

## 2022-01-24 PROCEDURE — 84132 ASSAY OF SERUM POTASSIUM: CPT | Performed by: HOSPITALIST

## 2022-01-24 PROCEDURE — 250N000009 HC RX 250: Performed by: CLINICAL NURSE SPECIALIST

## 2022-01-24 PROCEDURE — 99233 SBSQ HOSP IP/OBS HIGH 50: CPT | Performed by: HOSPITALIST

## 2022-01-24 PROCEDURE — 250N000011 HC RX IP 250 OP 636: Performed by: CLINICAL NURSE SPECIALIST

## 2022-01-24 PROCEDURE — 99232 SBSQ HOSP IP/OBS MODERATE 35: CPT | Performed by: CLINICAL NURSE SPECIALIST

## 2022-01-24 PROCEDURE — 250N000013 HC RX MED GY IP 250 OP 250 PS 637: Performed by: STUDENT IN AN ORGANIZED HEALTH CARE EDUCATION/TRAINING PROGRAM

## 2022-01-24 PROCEDURE — 250N000011 HC RX IP 250 OP 636: Performed by: STUDENT IN AN ORGANIZED HEALTH CARE EDUCATION/TRAINING PROGRAM

## 2022-01-24 PROCEDURE — 250N000013 HC RX MED GY IP 250 OP 250 PS 637: Performed by: PHYSICIAN ASSISTANT

## 2022-01-24 PROCEDURE — 99207 PR CDG-CODE INCORRECT PER BILLING BASED ON TIME: CPT | Performed by: HOSPITALIST

## 2022-01-24 PROCEDURE — 120N000001 HC R&B MED SURG/OB

## 2022-01-24 RX ORDER — GABAPENTIN 300 MG/1
300 CAPSULE ORAL 3 TIMES DAILY
Status: DISCONTINUED | OUTPATIENT
Start: 2022-01-24 | End: 2022-01-25

## 2022-01-24 RX ORDER — HYDROMORPHONE HCL IN WATER/PF 6 MG/30 ML
0.5 PATIENT CONTROLLED ANALGESIA SYRINGE INTRAVENOUS ONCE
Status: COMPLETED | OUTPATIENT
Start: 2022-01-24 | End: 2022-01-24

## 2022-01-24 RX ORDER — ALPRAZOLAM 0.5 MG
1 TABLET ORAL 3 TIMES DAILY PRN
Status: DISCONTINUED | OUTPATIENT
Start: 2022-01-24 | End: 2022-01-25

## 2022-01-24 RX ORDER — KETOROLAC TROMETHAMINE 30 MG/ML
15 INJECTION, SOLUTION INTRAMUSCULAR; INTRAVENOUS EVERY 6 HOURS
Status: COMPLETED | OUTPATIENT
Start: 2022-01-24 | End: 2022-01-25

## 2022-01-24 RX ORDER — LIDOCAINE 50 MG/G
OINTMENT TOPICAL 4 TIMES DAILY
Status: DISCONTINUED | OUTPATIENT
Start: 2022-01-24 | End: 2022-01-28 | Stop reason: HOSPADM

## 2022-01-24 RX ADMIN — LIDOCAINE: 50 OINTMENT TOPICAL at 21:11

## 2022-01-24 RX ADMIN — DIAZEPAM 10 MG: 10 TABLET ORAL at 12:12

## 2022-01-24 RX ADMIN — Medication 100 MG: at 08:04

## 2022-01-24 RX ADMIN — DIAZEPAM 10 MG: 10 TABLET ORAL at 00:53

## 2022-01-24 RX ADMIN — CLONIDINE HYDROCHLORIDE 0.1 MG: 0.1 TABLET ORAL at 08:04

## 2022-01-24 RX ADMIN — DIAZEPAM 10 MG: 10 TABLET ORAL at 23:02

## 2022-01-24 RX ADMIN — PANTOPRAZOLE SODIUM 40 MG: 40 INJECTION, POWDER, FOR SOLUTION INTRAVENOUS at 08:04

## 2022-01-24 RX ADMIN — HYDROMORPHONE HYDROCHLORIDE 1 MG: 1 INJECTION, SOLUTION INTRAMUSCULAR; INTRAVENOUS; SUBCUTANEOUS at 19:06

## 2022-01-24 RX ADMIN — NICOTINE 1 PATCH: 14 PATCH, EXTENDED RELEASE TRANSDERMAL at 10:06

## 2022-01-24 RX ADMIN — DIAZEPAM 10 MG: 10 TABLET ORAL at 04:26

## 2022-01-24 RX ADMIN — HYDROMORPHONE HYDROCHLORIDE 1 MG: 1 INJECTION, SOLUTION INTRAMUSCULAR; INTRAVENOUS; SUBCUTANEOUS at 07:13

## 2022-01-24 RX ADMIN — LIDOCAINE: 50 OINTMENT TOPICAL at 16:18

## 2022-01-24 RX ADMIN — DIAZEPAM 10 MG: 10 TABLET ORAL at 14:06

## 2022-01-24 RX ADMIN — KETOROLAC TROMETHAMINE 15 MG: 30 INJECTION, SOLUTION INTRAMUSCULAR; INTRAVENOUS at 11:59

## 2022-01-24 RX ADMIN — DIAZEPAM 10 MG: 10 TABLET ORAL at 08:04

## 2022-01-24 RX ADMIN — HYDROMORPHONE HYDROCHLORIDE 1 MG: 1 INJECTION, SOLUTION INTRAMUSCULAR; INTRAVENOUS; SUBCUTANEOUS at 16:10

## 2022-01-24 RX ADMIN — HYDROMORPHONE HYDROCHLORIDE 0.5 MG: 0.2 INJECTION, SOLUTION INTRAMUSCULAR; INTRAVENOUS; SUBCUTANEOUS at 12:00

## 2022-01-24 RX ADMIN — DIAZEPAM 10 MG: 10 TABLET ORAL at 21:16

## 2022-01-24 RX ADMIN — DIAZEPAM 10 MG: 10 TABLET ORAL at 10:06

## 2022-01-24 RX ADMIN — CLONIDINE HYDROCHLORIDE 0.1 MG: 0.1 TABLET ORAL at 01:19

## 2022-01-24 RX ADMIN — DIAZEPAM 10 MG: 10 TABLET ORAL at 16:10

## 2022-01-24 RX ADMIN — GABAPENTIN 300 MG: 300 CAPSULE ORAL at 21:11

## 2022-01-24 RX ADMIN — KETOROLAC TROMETHAMINE 15 MG: 30 INJECTION, SOLUTION INTRAMUSCULAR; INTRAVENOUS at 18:12

## 2022-01-24 RX ADMIN — Medication 1 TABLET: at 08:04

## 2022-01-24 RX ADMIN — ACETAMINOPHEN 650 MG: 325 TABLET ORAL at 00:54

## 2022-01-24 RX ADMIN — HYDROMORPHONE HYDROCHLORIDE 1 MG: 1 INJECTION, SOLUTION INTRAMUSCULAR; INTRAVENOUS; SUBCUTANEOUS at 04:26

## 2022-01-24 RX ADMIN — LIDOCAINE HYDROCHLORIDE 30 ML: 20 SOLUTION ORAL; TOPICAL at 16:17

## 2022-01-24 RX ADMIN — LIDOCAINE: 50 OINTMENT TOPICAL at 14:08

## 2022-01-24 RX ADMIN — FOLIC ACID 1 MG: 1 TABLET ORAL at 08:04

## 2022-01-24 RX ADMIN — GABAPENTIN 300 MG: 300 CAPSULE ORAL at 13:13

## 2022-01-24 RX ADMIN — HYDROMORPHONE HYDROCHLORIDE 1 MG: 1 INJECTION, SOLUTION INTRAMUSCULAR; INTRAVENOUS; SUBCUTANEOUS at 13:12

## 2022-01-24 RX ADMIN — HYDROMORPHONE HYDROCHLORIDE 1 MG: 1 INJECTION, SOLUTION INTRAMUSCULAR; INTRAVENOUS; SUBCUTANEOUS at 10:11

## 2022-01-24 RX ADMIN — HYDROMORPHONE HYDROCHLORIDE 1 MG: 1 INJECTION, SOLUTION INTRAMUSCULAR; INTRAVENOUS; SUBCUTANEOUS at 22:17

## 2022-01-24 RX ADMIN — DIAZEPAM 10 MG: 10 TABLET ORAL at 19:06

## 2022-01-24 RX ADMIN — HYDROMORPHONE HYDROCHLORIDE 1 MG: 1 INJECTION, SOLUTION INTRAMUSCULAR; INTRAVENOUS; SUBCUTANEOUS at 01:20

## 2022-01-24 RX ADMIN — Medication: at 18:13

## 2022-01-24 RX ADMIN — Medication: at 08:08

## 2022-01-24 RX ADMIN — CLONIDINE HYDROCHLORIDE 0.1 MG: 0.1 TABLET ORAL at 16:17

## 2022-01-24 ASSESSMENT — ACTIVITIES OF DAILY LIVING (ADL)
ADLS_ACUITY_SCORE: 3

## 2022-01-24 NOTE — PLAN OF CARE
Problem: Adult Inpatient Plan of Care  Goal: Absence of Hospital-Acquired Illness or Injury  Intervention: Identify and Manage Fall Risk  Recent Flowsheet Documentation  Taken 1/24/2022 0050 by Claude Styles RN  Safety Promotion/Fall Prevention: nonskid shoes/slippers when out of bed     Problem: Adult Inpatient Plan of Care  Goal: Optimal Comfort and Wellbeing  Outcome: Improving   Pain management with dilaudid every 3 hrs. CIWA score above 8 x2, valium given x2. NS infusing at 100 ml/hr. Appears comfortable. NPO, ice chips only, call light within reach.

## 2022-01-24 NOTE — PLAN OF CARE
Patient requesting prn dilaudid every 3 hours, rates abdominal pain 8/10. Remains NPO, ice chips given per orders. CIWA scoring 8, prn valium given x2. Patient appears calm and pleasant however states anxiety is severe. IVF infusing per orders.    Clare Bocanegra RN

## 2022-01-24 NOTE — PROGRESS NOTES
[facility] ACUTE PAIN SERVICE    ([facility], [facility], [facility])   Daily PAIN Progress Note    Assessment/Plan:  Javier Rivas is a 26 year old male who was admitted on 1/21/2022. Pain Service is asked to see the patient for pain management. Admitted for evaluation of abdominal pian. History of alcohol dependence, duodenitis.  Pain began about  6 hours prior to coming into hospital.  Patient with alcohol mohinder disorder previous withdrawal symptoms no seizures.     [facility] Consult:   The patient doesn't smoke tobacco does vape, and regular marijuana use.   He denies chronic abdominal pain.    Imaging:  Edema of the pancreatic head wall thickening of adjacent duodenum not excluded, fatty liver.  Recommend: smoking and alcohol cessation, IVF, Pain Control, NPO     Patient is on CIWA protocol.    Discussed severity of his condition.  He has had 8 episodes over the past 8 months.    He is agreeable to having someone come by and discuss treatment options.  Rule 25 assessment placed.    Over the past 24 hours patient received 4(0.5mg) and 6(1mg) IV hydromorphone 160 MME    Patient continues to have complaints of abdominal pain, with diffuse tenderness.  He is also quite anxious.    Discussed adding in toradol for 4 doses, agreed to leave IV hydromorphone as currently available.    He is agreeable to trying gabapentin,lidocaine, heat and acupuncture.          PLAN:   1) Pain is consistent with acute abdominal pain associated with pancreatitis.     2)Multimodal Medication Therapy  Topical: none for now  NSAID: Toradol 15 mg every 6 hours times for 4 doses   Muscle Relaxants: none  Adjuvants: gabapentin 300 mg tid   Antidepressants/anxiolytics: valium per CIWA  Opioids: will hold until off IV  IV Pain medication:  Hydromorphone 1 mg every 3 hours for another  24 hours   3)Non-medication interventions  Pharmacy consult- appreciate recommendations   Acupuncture consult- as available Mon and Thursday     Integrative consult - called referral to [phone]   4)Constipation Prophylaxis  Daily stool softener/laxative as needed  5) Follow up   -Opioid prescriber has been none  -Discharge Recommendations - We recommend prescribing the following at the time of discharge: TBD  Oxycodone 5 mg tablets #12 tablets           Active Problems:    Acute pancreatitis    Nausea    Pancreatitis    Duodenitis    Alcohol dependence with uncomplicated intoxication (H)    LUQ abdominal pain     LOS: 2 days       Subjective:  Patient reports pain is diffuse, painful to light touch still feels like he isn't get substantial pian control and asking if dose can be increased.      cloNIDine  0.1 mg Oral Q8H     folic acid  1 mg Oral Daily     multivitamin w/minerals  1 tablet Oral Daily     nicotine  1 patch Transdermal Daily     nicotine   Transdermal Q8H     pantoprazole (PROTONIX) IV  40 mg Intravenous Daily with breakfast     thiamine  100 mg Oral Daily       Objective:  Vital signs in last 24 hours:  Temp:  [97.7  F (36.5  C)-98.9  F (37.2  C)] 98.5  F (36.9  C)  Pulse:  [] 99  Resp:  [16-20] 18  BP: (131-164)/(70-97) 157/88  SpO2:  [96 %-100 %] 98 %  Weight:   Weight change:   Body mass index is 22.54 kg/m .    Intake/Output last 3 shifts:  I/O last 3 completed shifts:  In: 3057 [P.O.:240; I.V.:2817]  Out: -   Intake/Output this shift:  No intake/output data recorded.    Review of Systems:   As per subjective, all others negative.    Physical Exam:    General Appearance:  Alert, cooperative, anxious, on computer   Head:  Normocephalic, without obvious abnormality, atraumatic   Eyes:  PERRL, conjunctiva/corneas clear, EOM's intact   Nose: Nares normal, septum midline, mucosa normal, no drainage   Throat: Lips, mucosa, and tongue normal; teeth and gums normal   Neck: Supple, symmetrical, trachea midline   Back:   Symmetric, no curvature, ROM normal, no CVA tenderness   Lungs:   Respirations unlabored   Chest Wall:  No tenderness or  deformity   Heart:  Regular rate and rhythm   Abdomen:   Soft, tender, non distended appearance, hypoactive  bowel sounds    Extremities: Extremities normal, atraumatic, no cyanosis or edema   Skin: Skin color, texture, turgor normal, no rashes or lesions   Neurologic: Alert and oriented X 3, Moves all 4 extremities          Imaging:  Personally Reviewed.  CT Abdomen Pelvis w Contrast    Result Date: 1/21/2022  EXAM: CT ABDOMEN PELVIS W CONTRAST LOCATION: [facility] DATE/TIME: 1/21/2022 10:35 PM INDICATION: Left lower quadrant pain. COMPARISON: 12/30/2021 TECHNIQUE: CT scan of the abdomen and pelvis was performed following injection of IV contrast. Multiplanar reformats were obtained. Dose reduction techniques were used. CONTRAST: isovue 370 100ml     IMPRESSION: 1.  Edema of the pancreatic head and wall thickening of the adjacent duodenum not excluded. Correlate clinically for pancreatitis versus duodenitis. 2.  Colon is underdistended reducing evaluation for wall thickening/early changes of colitis. 3.  Nonobstructing right renal calculus. 4.  Fatty liver. 5.  Normal appendix.      Lab Results:  Reviewed.   Recent Labs   Lab 01/23/22  1018 01/21/22  2141   WBC 5.0 6.8   HGB 13.2* 15.9   HCT 39.9* 47.3    289     Recent Labs   Lab 01/23/22  1018 01/21/22  2141   * 144   CO2 24 25   BUN 3* 11   ALBUMIN 3.6 4.6   ALKPHOS 46 58   ALT 25 39   AST 32 39     No results for input(s): INR in the last 168 hours.      Total time spent 25 minutes with greater than 50% in consultation, education and coordination of care.     Also discussed with RN and GI discussion      Jailyn Mccarthy APRN, CNS-BC, DNP  Acute Care Pain Management Program  [facility] ([place], [place], [place])   With questions call [phone]  Preference if for [facility]  Click HERE to page Yulissa

## 2022-01-24 NOTE — PLAN OF CARE
Problem: Acute Neurologic Deterioration (Alcohol Withdrawal)  Goal: Optimal Neurologic Function  Outcome: No Change   Pt scoring on CIWA.  He has been getting Valium every 2 hours.      Also getting IV Dilaudid every 3 hours.    Advanced to a clear liquid diet.  Gave pt a GI cocktail PRN.  Denied nausea this shift.  On K protocol, recheck in am.    IV fluids running at 100 ml/hr.  Accupuncture was done this afternoon.  Toradol also added to pain regime.   Pt up independently in room.

## 2022-01-24 NOTE — CONSULTS
"ACUPUNCTURIST TREATMENT NOTE    Name: Javier Rivas  :  1995  MRN:  [id number]    Acupuncture Treatment  Patient Type: Medical  Intervention Reason: Pain,Anxiety/Stress  Pain Location: abdomen, mid back  Pre-session Pain Ratin  Post-session Pain Ratin  Pre-session Stress/Anxiety ratin  Post-session Stress/Anxiety ratin  Patient complaint:: Patient complains of pain in abdomen radiating into mid/upper back, high anxiety  Initial insertions: Baihui, Du24, Yintang, Li11, St34, St36, Gb34, Sp6, Liv3, St44. (R) NADA (L) Li4  Number of needles inserted: 23  Number of needles removed: 23  Treatment Observations: Patient fell asleep during treatment and relaxed well.  Acupuncture Treatment Recommendations: Patient is interested in outpatient follow up- provided information and referral for Pain Center.         \"Risks and benefits of acupuncture were discussed with patient. Consent for treatment was given. We thank you for the referral.\"     Audelia Smith L.Ac.    Date:  2022  Time:  1:57 PM    "

## 2022-01-24 NOTE — PROGRESS NOTES
Hospitalist Progress Note    Assessment/Plan    Active Problems:    Acute pancreatitis    Nausea    Pancreatitis    Duodenitis    Alcohol dependence with uncomplicated intoxication (H)    LUQ abdominal pain         26-year-old patient with history of polysubstance abuse (alcohol, cannabis). History of bipolar and panic disorder, presented to the hospital with the left upper quadrant pain after drinking alcohol found to have acute pancreatitis     Abdominal pain due to acute pancreatitis  Admitted to the hospital on January 21, CT abdomen showed edema at the pancreatic head adjacent duodenitis cannot be excluded  Slightly elevated lipase at 70,  Continue to have severe pain, pain management team consulted and managing his regimen,  He asked me about medication upon discharge if he can get as needed narcotics for worsening pain and I told him if he still needs oral pain meds we can give him few day supply only not for long time when he is ready to discharge home  Appreciate GI input,       Alcohol intoxication with withdrawal  Appears to be anxious on CIWA protocol receiving as needed Valium,     Renal calculus  Incidental finding on CT abdomen nonobstructive right renal calculus,  Follow-up as an outpatient     Tobacco abuse  Nicotine patch ordered       Barriers to Discharge: Pain control,    Anticipated discharge date/Disposition: Home in few days    Subjective  Patient was seen today appears comfortable in the bed using his laptop, he asks me if I can prescribe him pain medication to be taken as needed for emergency situation when he does have this severe pain after he discharges and I explained to him we only give him a few pills to cover him few days after discharge if he still needs pain med on the discharge day, he also feels anxious and asked me if I can get him additional Ativan as needed    Objective    Vital signs in last 24 hours  Temp:  [97.7  F (36.5  C)-99.2  F (37.3  C)] 99.2  F (37.3  C)  Pulse:   [] 99  Resp:  [16-20] 16  BP: (140-164)/(80-97) 140/91  SpO2:  [98 %-100 %] 98 % [unfilled] O2 Device: None (Room air)    Weight:   [unfilled] Weight change:     Intake/Output last 3 shifts  I/O last 3 completed shifts:  In: 3057 [P.O.:240; I.V.:2817]  Out: -   Body mass index is 22.54 kg/m .    Physical Exam:  General Appearance: Alert, oriented x3, does not appear in distress.  HEENT: Normocephalic. No scleral icterus, . Mucous membranes moist.  Heart: :Regular rate and rhythm, normal S1 ,S2, No murmurs, no JVD, no pedal edema   Lungs: Clear to auscultation bilaterally. No wheezing or crackles  Abdomen: Soft, left upper quadrant tender, no rebound or rigidity, non distended, bowel sounds present.      Pertinent Labs   Lab Results: personally reviewed.   Recent Labs   Lab 01/23/22  1018 01/21/22  2141   * 144   CO2 24 25   BUN 3* 11   ALBUMIN 3.6 4.6   ALKPHOS 46 58   ALT 25 39   AST 32 39     Recent Labs   Lab 01/23/22  1018 01/21/22  2141   WBC 5.0 6.8   HGB 13.2* 15.9   HCT 39.9* 47.3    289     Recent Labs   Lab 01/21/22  2141   TROPONINI <0.01     Invalid input(s): POCGLUFGR      Advanced Care Planning:  Discharge Planning discussed with patient  Total time with this patient is 40 min with 50% of time spent in examining the patient, reviewing records, discussing plan of care and counseling, 50% of time spent in coordination of care.  Care discussed and coordinated with pain management team, RN at bedside.      eHather Lpóez MD  Internal Medicine Hospitalist  1/24/2022

## 2022-01-24 NOTE — PROGRESS NOTES
[facility] Progress Note    Subjective:  Pt reports pain somewhat better controlled, however IV dilaudid does not offer long enough relief. Mild nausea without emesis. Denies fever, chills, SOB. +Appetite. Urinating well; yellow, denies clear or dark urine. +3L    Objective:  Vital signs in last 24 hours  Temp:  [97.7  F (36.5  C)-99.2  F (37.3  C)] 99.2  F (37.3  C)  Pulse:  [] 99  Resp:  [16-20] 16  BP: (140-164)/(80-97) 140/91  SpO2:  [98 %-100 %] 98 % O2 Device: None (Room air)      Gen: A&Ox3, NAD  Eyes: No icterus  Pulmonary: CTA bilaterally  Cardiovascular: RRR,S1, S2  Gastrointestinal: Non-distended, TTP in epigastrium and LUQ, +guarding with mild rebound  Extrem: PPI, no c/c/e      LABORATORY    ELECTROLYTE PANEL   Recent Labs   Lab 01/24/22  0753 01/23/22  2334 01/23/22  1739 01/23/22  1018 01/21/22  2141   NA  --   --   --  135* 144   POTASSIUM 3.5 3.8 3.4* 3.4* 3.9   CHLORIDE  --   --   --  99 104   CO2  --   --   --  24 25   GLC  --   --   --  69* 94   CR  --   --   --  0.69* 0.80   BUN  --   --   --  3* 11      HEMATOLOGY PANEL   Recent Labs   Lab 01/23/22  1018 01/21/22  2141   HGB 13.2* 15.9   MCV 91 90   WBC 5.0 6.8    289      LIVER AND PANCREAS PANEL   Recent Labs   Lab 01/23/22  1018 01/21/22  2141   AST 32 39   ALT 25 39   ALKPHOS 46 58   BILITOTAL 0.9 0.4   LIPASE 360* 70*     IMAGING STUDIES    CT Abdomen Pelvis w Contrast    Result Date: 1/21/2022  EXAM: CT ABDOMEN PELVIS W CONTRAST LOCATION: [facility] DATE/TIME: 1/21/2022 10:35 PM INDICATION: Left lower quadrant pain. COMPARISON: 12/30/2021 TECHNIQUE: CT scan of the abdomen and pelvis was performed following injection of IV contrast. Multiplanar reformats were obtained. Dose reduction techniques were used. CONTRAST: isovue 370 100ml FINDINGS: LOWER CHEST: Lung bases clear. HEPATOBILIARY: Hepatic steatosis. PANCREAS: Edema of the pancreatic head wall thickening of adjacent duodenum not  excluded. SPLEEN: Normal. ADRENAL GLANDS: Normal. KIDNEYS/BLADDER: Subcentimeter renal hypodensities small for characterization. Nonobstructing right renal calculus. BOWEL: Underdistention of the colon reduces evaluation for wall thickening/colitis. The appendix is normal. Bowel is normal caliber. LYMPH NODES: Normal. VASCULATURE: Unremarkable. PELVIC ORGANS: Prostate calcification. MUSCULOSKELETAL: Normal.     IMPRESSION: 1.  Edema of the pancreatic head and wall thickening of the adjacent duodenum not excluded. Correlate clinically for pancreatitis versus duodenitis. 2.  Colon is underdistended reducing evaluation for wall thickening/early changes of colitis. 3.  Nonobstructing right renal calculus. 4.  Fatty liver. 5.  Normal appendix.      I have reviewed the current diagnostic and laboratory tests.                Assessment:  1. Recurrent EtOH pancreatitis - chronic ETOH and tobacco abuse; intoxicated on admission. No evidence of calcification on imaging remains at risk for chronic pancreatitis.       Patient Active Problem List   Diagnosis     Major depression     Acute pancreatitis     Nausea     Pancreatitis     Duodenitis     Alcohol dependence with uncomplicated intoxication (H)     LUQ abdominal pain       Plan:  -Further discussed EtOH abstinence indefinitely and tobacco cessation.  -Clear liquid diet.  -IVF. Will need to decrease rate within 24 hours to prevent fluid overload.  -Analgesia as needed. Appreciate pain management consultation. OK to administer Toradol for ~4 doses if needed.  -Continue pantoprazole 40 mg daily as inpt.  -EtOH withdrawal protocol.                                                  Saul Davis MD  Thank you for the opportunity to participate in the care of this patient.   Please feel free to call me with any questions or concerns.  Phone number [phone].

## 2022-01-24 NOTE — PROGRESS NOTES
Care Management Follow Up    Length of Stay (days): 2    Expected Discharge Date: 01/25/2022     Concerns to be Addressed:     Medical progression control, NPO.   Patient plan of care discussed at interdisciplinary rounds: Yes    Anticipated Discharge Disposition:  Home     Anticipated Discharge Services:  None anticipated   Anticipated Discharge DME:  None anticipated     Patient/family educated on Medicare website which has current facility and service quality ratings:  Not at this time  Education Provided on the Discharge Plan:  Per team  Patient/Family in Agreement with the Plan:  yes    Referrals Placed by CM/SW:  None  Private pay costs discussed: Not applicable    Additional Information:  Chart Reviewed. From home grandparents and independent at baseline. Works from home. May need CD resources prior to discharge. Family to transport. Care manager to follow.       Pavithra Ulrich RN

## 2022-01-25 PROBLEM — F11.20 OPIOID USE DISORDER, SEVERE, DEPENDENCE (H): Status: ACTIVE | Noted: 2022-01-25

## 2022-01-25 PROBLEM — F10.20 ALCOHOL USE DISORDER, SEVERE, DEPENDENCE (H): Status: ACTIVE | Noted: 2022-01-22

## 2022-01-25 PROBLEM — F10.939 ALCOHOL WITHDRAWAL SYNDROME (H): Status: ACTIVE | Noted: 2021-09-01

## 2022-01-25 LAB — POTASSIUM BLD-SCNC: 3.7 MMOL/L (ref 3.5–5)

## 2022-01-25 PROCEDURE — 250N000011 HC RX IP 250 OP 636: Performed by: PAIN MEDICINE

## 2022-01-25 PROCEDURE — 99223 1ST HOSP IP/OBS HIGH 75: CPT | Mod: 95 | Performed by: FAMILY MEDICINE

## 2022-01-25 PROCEDURE — 250N000013 HC RX MED GY IP 250 OP 250 PS 637: Performed by: PHYSICIAN ASSISTANT

## 2022-01-25 PROCEDURE — 250N000013 HC RX MED GY IP 250 OP 250 PS 637: Performed by: CLINICAL NURSE SPECIALIST

## 2022-01-25 PROCEDURE — C9113 INJ PANTOPRAZOLE SODIUM, VIA: HCPCS | Performed by: STUDENT IN AN ORGANIZED HEALTH CARE EDUCATION/TRAINING PROGRAM

## 2022-01-25 PROCEDURE — 120N000001 HC R&B MED SURG/OB

## 2022-01-25 PROCEDURE — 250N000013 HC RX MED GY IP 250 OP 250 PS 637: Performed by: HOSPITALIST

## 2022-01-25 PROCEDURE — 84132 ASSAY OF SERUM POTASSIUM: CPT | Performed by: HOSPITALIST

## 2022-01-25 PROCEDURE — 36415 COLL VENOUS BLD VENIPUNCTURE: CPT | Performed by: HOSPITALIST

## 2022-01-25 PROCEDURE — 250N000013 HC RX MED GY IP 250 OP 250 PS 637: Performed by: INTERNAL MEDICINE

## 2022-01-25 PROCEDURE — 258N000003 HC RX IP 258 OP 636: Performed by: INTERNAL MEDICINE

## 2022-01-25 PROCEDURE — 250N000013 HC RX MED GY IP 250 OP 250 PS 637: Performed by: PAIN MEDICINE

## 2022-01-25 PROCEDURE — 258N000003 HC RX IP 258 OP 636: Performed by: HOSPITALIST

## 2022-01-25 PROCEDURE — 99232 SBSQ HOSP IP/OBS MODERATE 35: CPT | Performed by: INTERNAL MEDICINE

## 2022-01-25 PROCEDURE — 250N000013 HC RX MED GY IP 250 OP 250 PS 637: Performed by: FAMILY MEDICINE

## 2022-01-25 PROCEDURE — 250N000013 HC RX MED GY IP 250 OP 250 PS 637: Performed by: STUDENT IN AN ORGANIZED HEALTH CARE EDUCATION/TRAINING PROGRAM

## 2022-01-25 PROCEDURE — 250N000011 HC RX IP 250 OP 636: Performed by: CLINICAL NURSE SPECIALIST

## 2022-01-25 PROCEDURE — 250N000011 HC RX IP 250 OP 636: Performed by: STUDENT IN AN ORGANIZED HEALTH CARE EDUCATION/TRAINING PROGRAM

## 2022-01-25 RX ORDER — ALPRAZOLAM 0.5 MG
1 TABLET ORAL 4 TIMES DAILY PRN
Status: DISCONTINUED | OUTPATIENT
Start: 2022-01-25 | End: 2022-01-25

## 2022-01-25 RX ORDER — GABAPENTIN 300 MG/1
600 CAPSULE ORAL 3 TIMES DAILY
Status: DISCONTINUED | OUTPATIENT
Start: 2022-01-25 | End: 2022-01-28

## 2022-01-25 RX ORDER — LORAZEPAM 1 MG/1
1 TABLET ORAL EVERY 4 HOURS
Status: COMPLETED | OUTPATIENT
Start: 2022-01-25 | End: 2022-01-25

## 2022-01-25 RX ORDER — HYDROMORPHONE HYDROCHLORIDE 2 MG/1
2-4 TABLET ORAL EVERY 4 HOURS PRN
Status: DISCONTINUED | OUTPATIENT
Start: 2022-01-25 | End: 2022-01-26

## 2022-01-25 RX ORDER — LORAZEPAM 2 MG/ML
1-2 INJECTION INTRAMUSCULAR EVERY 30 MIN PRN
Status: DISCONTINUED | OUTPATIENT
Start: 2022-01-25 | End: 2022-01-26

## 2022-01-25 RX ORDER — LORAZEPAM 1 MG/1
1 TABLET ORAL 3 TIMES DAILY
Status: COMPLETED | OUTPATIENT
Start: 2022-01-26 | End: 2022-01-27

## 2022-01-25 RX ORDER — SODIUM CHLORIDE, SODIUM LACTATE, POTASSIUM CHLORIDE, CALCIUM CHLORIDE 600; 310; 30; 20 MG/100ML; MG/100ML; MG/100ML; MG/100ML
INJECTION, SOLUTION INTRAVENOUS CONTINUOUS
Status: DISCONTINUED | OUTPATIENT
Start: 2022-01-25 | End: 2022-01-27

## 2022-01-25 RX ORDER — HYDROMORPHONE HCL IN WATER/PF 6 MG/30 ML
.5-1 PATIENT CONTROLLED ANALGESIA SYRINGE INTRAVENOUS EVERY 6 HOURS PRN
Status: DISCONTINUED | OUTPATIENT
Start: 2022-01-26 | End: 2022-01-26

## 2022-01-25 RX ORDER — LORAZEPAM 1 MG/1
1-2 TABLET ORAL EVERY 30 MIN PRN
Status: DISCONTINUED | OUTPATIENT
Start: 2022-01-25 | End: 2022-01-26

## 2022-01-25 RX ORDER — LORAZEPAM 1 MG/1
2 TABLET ORAL AT BEDTIME
Status: COMPLETED | OUTPATIENT
Start: 2022-01-25 | End: 2022-01-25

## 2022-01-25 RX ADMIN — ALPRAZOLAM 1 MG: 0.5 TABLET ORAL at 06:22

## 2022-01-25 RX ADMIN — HYDROMORPHONE HYDROCHLORIDE 4 MG: 2 TABLET ORAL at 11:19

## 2022-01-25 RX ADMIN — CLONIDINE HYDROCHLORIDE 0.1 MG: 0.1 TABLET ORAL at 08:42

## 2022-01-25 RX ADMIN — KETOROLAC TROMETHAMINE 15 MG: 30 INJECTION, SOLUTION INTRAMUSCULAR; INTRAVENOUS at 06:20

## 2022-01-25 RX ADMIN — DIAZEPAM 10 MG: 10 TABLET ORAL at 11:19

## 2022-01-25 RX ADMIN — HYDROMORPHONE HYDROCHLORIDE 1 MG: 1 INJECTION, SOLUTION INTRAMUSCULAR; INTRAVENOUS; SUBCUTANEOUS at 12:03

## 2022-01-25 RX ADMIN — GABAPENTIN 600 MG: 300 CAPSULE ORAL at 14:10

## 2022-01-25 RX ADMIN — HYDROMORPHONE HYDROCHLORIDE 1 MG: 1 INJECTION, SOLUTION INTRAMUSCULAR; INTRAVENOUS; SUBCUTANEOUS at 16:08

## 2022-01-25 RX ADMIN — GABAPENTIN 600 MG: 300 CAPSULE ORAL at 21:24

## 2022-01-25 RX ADMIN — NICOTINE 1 PATCH: 14 PATCH, EXTENDED RELEASE TRANSDERMAL at 09:03

## 2022-01-25 RX ADMIN — LORAZEPAM 2 MG: 1 TABLET ORAL at 21:24

## 2022-01-25 RX ADMIN — SODIUM CHLORIDE, POTASSIUM CHLORIDE, SODIUM LACTATE AND CALCIUM CHLORIDE: 600; 310; 30; 20 INJECTION, SOLUTION INTRAVENOUS at 23:06

## 2022-01-25 RX ADMIN — Medication: at 03:17

## 2022-01-25 RX ADMIN — DIAZEPAM 10 MG: 10 TABLET ORAL at 09:01

## 2022-01-25 RX ADMIN — HYDROMORPHONE HYDROCHLORIDE 1 MG: 1 INJECTION, SOLUTION INTRAMUSCULAR; INTRAVENOUS; SUBCUTANEOUS at 19:31

## 2022-01-25 RX ADMIN — HYDROMORPHONE HYDROCHLORIDE 1 MG: 1 INJECTION, SOLUTION INTRAMUSCULAR; INTRAVENOUS; SUBCUTANEOUS at 03:18

## 2022-01-25 RX ADMIN — HYDROMORPHONE HYDROCHLORIDE 2 MG: 2 TABLET ORAL at 21:32

## 2022-01-25 RX ADMIN — DIAZEPAM 10 MG: 10 TABLET ORAL at 02:07

## 2022-01-25 RX ADMIN — LORAZEPAM 1 MG: 1 TABLET ORAL at 17:02

## 2022-01-25 RX ADMIN — Medication: at 12:03

## 2022-01-25 RX ADMIN — LIDOCAINE: 50 OINTMENT TOPICAL at 09:02

## 2022-01-25 RX ADMIN — Medication 100 MG: at 08:41

## 2022-01-25 RX ADMIN — LIDOCAINE: 50 OINTMENT TOPICAL at 21:27

## 2022-01-25 RX ADMIN — HYDROMORPHONE HYDROCHLORIDE 1 MG: 1 INJECTION, SOLUTION INTRAMUSCULAR; INTRAVENOUS; SUBCUTANEOUS at 00:15

## 2022-01-25 RX ADMIN — KETOROLAC TROMETHAMINE 15 MG: 30 INJECTION, SOLUTION INTRAMUSCULAR; INTRAVENOUS at 00:15

## 2022-01-25 RX ADMIN — CLONIDINE HYDROCHLORIDE 0.1 MG: 0.1 TABLET ORAL at 00:12

## 2022-01-25 RX ADMIN — HYDROMORPHONE HYDROCHLORIDE 1 MG: 1 INJECTION, SOLUTION INTRAMUSCULAR; INTRAVENOUS; SUBCUTANEOUS at 08:42

## 2022-01-25 RX ADMIN — LIDOCAINE: 50 OINTMENT TOPICAL at 17:11

## 2022-01-25 RX ADMIN — CLONIDINE HYDROCHLORIDE 0.1 MG: 0.1 TABLET ORAL at 17:11

## 2022-01-25 RX ADMIN — LORAZEPAM 1 MG: 1 TABLET ORAL at 14:10

## 2022-01-25 RX ADMIN — Medication: at 22:32

## 2022-01-25 RX ADMIN — Medication 1 TABLET: at 08:41

## 2022-01-25 RX ADMIN — DIAZEPAM 10 MG: 10 TABLET ORAL at 00:12

## 2022-01-25 RX ADMIN — SODIUM CHLORIDE, POTASSIUM CHLORIDE, SODIUM LACTATE AND CALCIUM CHLORIDE: 600; 310; 30; 20 INJECTION, SOLUTION INTRAVENOUS at 23:04

## 2022-01-25 RX ADMIN — LIDOCAINE: 50 OINTMENT TOPICAL at 12:06

## 2022-01-25 RX ADMIN — GABAPENTIN 300 MG: 300 CAPSULE ORAL at 08:42

## 2022-01-25 RX ADMIN — HYDROMORPHONE HYDROCHLORIDE 4 MG: 2 TABLET ORAL at 17:11

## 2022-01-25 RX ADMIN — PANTOPRAZOLE SODIUM 40 MG: 40 INJECTION, POWDER, FOR SOLUTION INTRAVENOUS at 08:41

## 2022-01-25 RX ADMIN — HYDROMORPHONE HYDROCHLORIDE 1 MG: 1 INJECTION, SOLUTION INTRAMUSCULAR; INTRAVENOUS; SUBCUTANEOUS at 22:34

## 2022-01-25 RX ADMIN — FOLIC ACID 1 MG: 1 TABLET ORAL at 08:41

## 2022-01-25 RX ADMIN — ALPRAZOLAM 1 MG: 0.5 TABLET ORAL at 01:38

## 2022-01-25 RX ADMIN — Medication: at 21:37

## 2022-01-25 ASSESSMENT — ACTIVITIES OF DAILY LIVING (ADL)
ADLS_ACUITY_SCORE: 3

## 2022-01-25 NOTE — PROGRESS NOTES
[facility] Progress Note    Subjective:  Pt reports pain is better controlled today. Denies improvement in overall pain, however medications offering relief. Tolerating clear liquids without worsening symptoms. Denies nausea, emesis. No fever. Urinating well.    Objective:  Vital signs in last 24 hours  Temp:  [97.4  F (36.3  C)-99  F (37.2  C)] 98.4  F (36.9  C)  Pulse:  [] 99  Resp:  [16-18] 16  BP: (121-184)/() 146/85  SpO2:  [95 %-100 %] 99 % O2 Device: None (Room air)    +6L    Gen: A&Ox3, NAD  Eyes: No icterus  Gastrointestinal: Non-distended, TTP in epigastrium and LUQ, +guarding with mild rebound  Extrem: PPI, no c/c/e      LABORATORY    ELECTROLYTE PANEL   Recent Labs   Lab 01/25/22  0706 01/24/22  0753 01/23/22  2334 01/23/22  1739 01/23/22  1018 01/21/22  2141   NA  --   --   --   --  135* 144   POTASSIUM 3.7 3.5 3.8   < > 3.4* 3.9   CHLORIDE  --   --   --   --  99 104   CO2  --   --   --   --  24 25   GLC  --   --   --   --  69* 94   CR  --   --   --   --  0.69* 0.80   BUN  --   --   --   --  3* 11    < > = values in this interval not displayed.      HEMATOLOGY PANEL   Recent Labs   Lab 01/23/22  1018 01/21/22  2141   HGB 13.2* 15.9   MCV 91 90   WBC 5.0 6.8    289      LIVER AND PANCREAS PANEL   Recent Labs   Lab 01/23/22  1018 01/21/22  2141   AST 32 39   ALT 25 39   ALKPHOS 46 58   BILITOTAL 0.9 0.4   LIPASE 360* 70*     IMAGING STUDIES    No results found.    I have reviewed the current diagnostic and laboratory tests.                Assessment:  1. Recurrent EtOH pancreatitis - chronic ETOH and tobacco abuse; intoxicated on admission. Stable. Pain controlled. Tolerating liquids.            Patient Active Problem List   Diagnosis     Major depression     Acute pancreatitis     Nausea     Pancreatitis     Duodenitis     Alcohol dependence with uncomplicated intoxication (H)     LUQ abdominal pain         Plan:  -Further discussed EtOH abstinence indefinitely and tobacco  cessation.  -Clear liquid diet. DO NOT advance diet until pt on oral analgesia.  -IVF. Change to LR. Continue 100 ml/hr.  -Analgesia as needed. Appreciate pain management consultation.  -Continue pantoprazole 40 mg daily as inpt.  -Repeat CMP, lipase, CBC in AM.  -EtOH withdrawal protocol.       Approximately 15 minutes of total time was spent providing patient care including patient evaluation, reviewing documentation/test results, and .                                                  Saul Davis MD  Thank you for the opportunity to participate in the care of this patient.   Please feel free to call me with any questions or concerns.  Phone number [phone].

## 2022-01-25 NOTE — PLAN OF CARE
Problem: Adult Inpatient Plan of Care  Goal: Plan of Care Review  Outcome: Improving   This morning patient requesting Xanax, keeps saying its the only thing that helps him.  Explained to patient it was too early for his Xanax.  He then requested IV dilaudid for 9/10 abdominal pain and he wanted to know why we weren't bringing it to him every 3 hours.  Reeducated patient on dilaudid being prn and that he would need to request it.  Scored a 12 on CIWA x2.  Received prn valium per protocol.  New orders to try po dilaudid for pain prior to giving IV dilaudid.  Gave 4mg po dilaudid for 9/10 abdominal pain.  Patient upset because he wanted the IV and oral dilaudid at the same time.  Educated patient on utilizing oral pain medications first and than IV meds for breakthrough pain.  Patient stated oral dilaudid didn't help at all  and continues to request IV dilaudid.

## 2022-01-25 NOTE — PLAN OF CARE
Problem: Adult Inpatient Plan of Care  Goal: Optimal Comfort and Wellbeing  Outcome: Improving     Problem: Alcohol Withdrawal  Goal: Alcohol Withdrawal Symptom Control  Outcome: Improving   Abdominal pain management with dilaudid and Toradol. Valium for CIWA score of 12 and 8. Xanax given for anxiety x2. Call light within reach.

## 2022-01-25 NOTE — PROGRESS NOTES
[facility]    PROGRESS NOTE - Hospitalist Service    Assessment and Plan    Active Problems:    Acute pancreatitis    Nausea    Duodenitis    Alcohol use disorder, severe, dependence (H)    LUQ abdominal pain    Alcohol withdrawal syndrome (H)    Opioid use disorder, severe, dependence (H)    Javier Rivas is a 26 year old old male with h/o polysubstance abuse (alcohol, cannabis). History of bipolar and panic disorder, presented to the hospital with the left upper quadrant pain after drinking alcohol found to have acute pancreatitis.     Abdominal pain due to acute pancreatitis  Admitted to the hospital on January 21, CT abdomen showed edema at the pancreatic head adjacent duodenitis cannot be excluded Slightly elevated lipase at 70,  - complains of severe pain rating 8-9/10 and on clears, discussed with patient and if that severe will need to make NPO again and reassess in am. Patient agrees with this plan  - Appreciate pain team   - GI following   - PPI      Alcohol intoxication with withdrawal h/o seizures with w/d in the past   - addiction medicine consulted appreciate recommendations   - patient not interested in naltrexone or acamprosate   - not interested in treatment   - no alprazolam not appropriate trt for w/d treatment, AM ordered Ativan   - hold on keppra lower risk of seizure withdrawal at this point per AM.   - gabapentin TID  - folic, thiamine and MVI      Renal calculus  Incidental finding on CT abdomen nonobstructive right renal calculus,  Follow-up as an outpatient      Tobacco abuse  Nicotine patch ordered       COVID-19 PCR Results    COVID-19 PCR Results 1/21/22   SARS CoV2 PCR Negative      Comments are available for some flowsheets but are not being displayed.         COVID-19 Antibody Results, Testing for Immunity    COVID-19 Antibody Results, Testing for Immunity   No data to display.            VTE prophylaxis:  SCDs  DIET: Orders Placed This Encounter      NPO  for Medical/Clinical Reasons Except for: Meds    Drains/Lines: none  Weight bearing status: WBAt  Disposition/Barriers to discharge: pending tolerating diet without pain   Code Status: Full Code    Subjective:  Patient sleeping and comfortable when came into room, continues to fall asleep while discussing with patient the plan. Informed patient agree with pain team recommendations and addiction medicine.     PHYSICAL EXAM  Temp:  [97.4  F (36.3  C)-99  F (37.2  C)] 98.4  F (36.9  C)  Pulse:  [] 99  Resp:  [16-18] 16  BP: (121-184)/() 146/85  SpO2:  [95 %-100 %] 99 %  Wt Readings from Last 1 Encounters:   01/22/22 75.4 kg (166 lb 3.2 oz)       Intake/Output Summary (Last 24 hours) at 1/25/2022 0735  Last data filed at 1/25/2022 0625  Gross per 24 hour   Intake 3016 ml   Output --   Net 3016 ml      Body mass index is 22.54 kg/m .    Physical Exam  Constitutional:       Comments: Comfortable, sleeping and NAD Groggy , falls sleep during discussing    HENT:      Head: Normocephalic and atraumatic.   Cardiovascular:      Rate and Rhythm: Regular rhythm. Tachycardia present.      Pulses: Normal pulses.      Heart sounds: Normal heart sounds.   Pulmonary:      Effort: Pulmonary effort is normal.      Breath sounds: Normal breath sounds.   Abdominal:      General: Bowel sounds are normal.      Palpations: Abdomen is soft.      Comments: ND   Musculoskeletal:      Right lower leg: No edema.      Left lower leg: No edema.   Skin:     General: Skin is warm and dry.      Capillary Refill: Capillary refill takes less than 2 seconds.   Neurological:      Comments: Falling asleep but no focal deficits       PERTINENT LABS/IMAGING:  Results for orders placed or performed during the hospital encounter of 01/21/22   CT Abdomen Pelvis w Contrast    Impression    IMPRESSION:   1.  Edema of the pancreatic head and wall thickening of the adjacent duodenum not excluded. Correlate clinically for pancreatitis versus  duodenitis.  2.  Colon is underdistended reducing evaluation for wall thickening/early changes of colitis.  3.  Nonobstructing right renal calculus.  4.  Fatty liver.  5.  Normal appendix.       Recent Labs   Lab 01/25/22  0706 01/24/22  0753 01/23/22  2334 01/23/22  1739 01/23/22  1018 01/21/22  2141   WBC  --   --   --   --  5.0 6.8   HGB  --   --   --   --  13.2* 15.9   MCV  --   --   --   --  91 90   PLT  --   --   --   --  171 289   NA  --   --   --   --  135* 144   POTASSIUM 3.7 3.5 3.8   < > 3.4* 3.9   CHLORIDE  --   --   --   --  99 104   CO2  --   --   --   --  24 25   BUN  --   --   --   --  3* 11   CR  --   --   --   --  0.69* 0.80   ANIONGAP  --   --   --   --  12 15   SOUTH  --   --   --   --  8.6 9.2   GLC  --   --   --   --  69* 94   ALBUMIN  --   --   --   --  3.6 4.6   PROTTOTAL  --   --   --   --  6.3 8.2*   BILITOTAL  --   --   --   --  0.9 0.4   ALKPHOS  --   --   --   --  46 58   ALT  --   --   --   --  25 39   AST  --   --   --   --  32 39   LIPASE  --   --   --   --  360* 70*    < > = values in this interval not displayed.     No results for input(s): CHOL, HDL, LDL, TRIG, CHOLHDLRATIO in the last 65702 hours.  No results for input(s): LDL in the last 34291 hours.  Recent Labs   Lab Test 01/24/22 0753 01/23/22 1739 01/23/22  1018   NA  --   --  135*   POTASSIUM 3.5   < > 3.4*   CHLORIDE  --   --  99   CO2  --   --  24   GLC  --   --  69*   BUN  --   --  3*   CR  --   --  0.69*   GFRESTIMATED  --   --  >90   SOUTH  --   --  8.6    < > = values in this interval not displayed.     No results for input(s): A1C in the last 75075 hours.  Recent Labs   Lab Test 01/23/22  1018 01/21/22  2141 10/01/21  0839   HGB 13.2* 15.9 15.0     Recent Labs   Lab Test 01/21/22 2141   TROPONINI <0.01     No results for input(s): BNP, NTBNPI, NTBNP in the last 58090 hours.  Recent Labs   Lab Test 10/19/15  2055   TSH 3.60     No results for input(s): INR in the last 93738 hours.    Shanika Dominguez MD  [facility]  [facility] Medicine Service  [phone]

## 2022-01-25 NOTE — PROGRESS NOTES
Care Management Follow Up    Length of Stay (days): 3    Expected Discharge Date: 01/26/2022     Concerns to be Addressed:     Medical progression-Pain Control   Patient plan of care discussed at interdisciplinary rounds: Yes    Anticipated Discharge Disposition:  Home      Anticipated Discharge Services:  None anticipated  Anticipated Discharge DME:  None Anticipated    Patient/family educated on Medicare website which has current facility and service quality ratings:  Not needed at this time  Education Provided on the Discharge Plan:  Per team  Patient/Family in Agreement with the Plan:  Yes    Referrals Placed by CM/SW:  None at this time   Private pay costs discussed: Not applicable    Additional Information:  Chart Reviewed. Working on pain control. Per note from pain team continue IV Dilaudid 1 mg for another 24 hours. From home with grandparents and independent at baseline. May need CD resources prior to discharge. Family to transport. Care manager to follow.       Pavithra Ulrich RN

## 2022-01-25 NOTE — PROGRESS NOTES
[facility] ACUTE PAIN SERVICE    ([facility], [facility], [facility])  Daily PAIN Progress Note    Assessment/Plan:    Javier Rivas is a 26 year old male who was admitted on 1/21/2022. Pain Service is asked to see the patient for pain management. Admitted for evaluation of abdominal pian. History of alcohol dependence, duodenitis.    Patient with alcohol use disorder previous withdrawal symptoms no seizures.     [facility] Consult:   The patient doesn't smoke tobacco does vape, and regular marijuana use.   He denies chronic abdominal pain.    Imaging:  Edema of the pancreatic head wall thickening of adjacent duodenum not excluded, fatty liver.  Recommend: smoking and alcohol cessation, IVF, Pain Control, now clear liquid diet   Over the past 24 hours patient received 8.5mg of IV Dilaudid for an MME of 170.      PLAN:   1) Pain is consistent with acute abdominal pain associated with pancreatitis.     2)Multimodal Medication Therapy  Topical: none for now  NSAID: Toradol 15 mg every 6 hours times for 4 doses : now complete  Muscle Relaxants: none  Adjuvants: gabapentin 300 mg tid   Antidepressants/anxiolytics: valium per CIWA  Opioids: initiate Dilaudid 2-4mg q4hprn, parameters in order, tolerating clears well  IV Pain medication:  Hydromorphone 1 mg every 3 hours for another  24 hours : order to change to 0.5-1mg q6hprn at 0800 tomorrow AM, goal to reduce even further by afternoon and off by midnight tomorrow. Patient is currently agreeable to this goal.  3)Non-medication interventions  Pharmacy consult- appreciate recommendations   Acupuncture consult- as available Mon and Thursday    Integrative consult - called referral to [phone]   4)Constipation Prophylaxis  Daily stool softener/laxative as needed  5) Follow up   -Opioid prescriber has been none  -Discharge Recommendations - We recommend prescribing the following at the time of discharge:  Oxycodone 5 mg tablets #12 tablets or equivalent Dilaudid.  "VS none        Subjective:  Patient says we have 'finally' gotten a dose of IV dilaudid that is 'working'. Patient has extreme anxiety about decreasing IV Dilaudid dose. Visibly upset when discussed. Discussed starting oral Dilaudid and will plan on decreasing IV Dose after oral starts to work. Patient is very very anxious. Describes Xanax is helping with that.       Addendum: patient revisited at 1530. Did not understand why getting IV and PO Dilaudid at the same time 'was illegal\" (his words), and was frustrated we would not give at the same time. Did spend about 15 min explaining opioid induced respiratory depression to patient, and risks of this especially in opioid naive patient. He becomes much more calm with current plan as I discuss it more thoroughly, and the pharmacokinetics of IV Vs PO Dilaudid. By end of convo, he is calm and on board with current plan. Discussed with patient that we will reduce both dose and frequency of IV Dilaudid first thing tomorrow AM, with goal to be off in less than 24 hours. He is agreeable.       <principal problem not specified>   Patient Active Problem List   Diagnosis     Major depression     Acute pancreatitis     Nausea     Pancreatitis     Duodenitis     Alcohol dependence with uncomplicated intoxication (H)     LUQ abdominal pain        History   Drug Use     Types: Marijuana         Tobacco Use      Smoking status: Never Smoker      Smokeless tobacco: Current User          cloNIDine  0.1 mg Oral Q8H     folic acid  1 mg Oral Daily     gabapentin  300 mg Oral TID     lidocaine   Topical 4x Daily     multivitamin w/minerals  1 tablet Oral Daily     nicotine  1 patch Transdermal Daily     nicotine   Transdermal Q8H     pantoprazole (PROTONIX) IV  40 mg Intravenous Daily with breakfast     thiamine  100 mg Oral Daily     Gemma Hudson, Shan  Acute Care Pain Management Program Hour 7a-1700  [facility] (RAJAN, Joes, Dat)   Page via Tindie- Click HERE to page Sweta " or call [phone]

## 2022-01-25 NOTE — CONSULTS
Addiction Medicine Consultation    Javier Rivas, 1995, [id number]    primary  Josué Adams, [phone]       Tele-Visit Details    Type of service:  Video Visit and switch to phone since sound not working  Video Start Time (time video started): 1117  Video End Time (time video stopped): 1145  Originating Location (pt. Location): [facility], patient's room  Distant Location (provider location): On site at [facility]  Reason for Televisit: COVID-19  Mode of Communication:  Video Conference via [employer]  Physician has received verbal consent for a video visit from the patient? Yes    Assessment and Plan:     Active Problems:    Acute pancreatitis    Nausea    Pancreatitis    Duodenitis    LUQ abdominal pain- noted pain team recommendations. Agree with avoiding large rx for opiates at discharge. Patient at very high risk for additional substance use disorders. Untreated pain also creates high risk for relapse to alcohol so withholding opiates may not be appropriate.      Alcohol use disorder, severe, dependence with acute uncomplicated alcohol withdrawal- patient with history of withdrawal seizure. CIWA triggered benzodiazepine administration seems to have peaked on 01/24/2022. He did require 100mg diazepam on 01/24 and additionally was given 2mg alprazolam. This morning thus far he has received diazepam 10mg. He is currently on gabapentin 300mg tid and refuses a higher dose.  Patient is disinterested in other pharmacotherapy including naltrexone or acamprosate.  He is also not interested in returning to substance use treatment since he relapsed for only 3 days.  The patient is hyper focused on receiving more alprazolam and convinced that diazepam is ineffective.  Explained to patient that alprazolam is inappropriate management for acute alcohol withdrawal and that he could be switched to lorazepam instead of diazepam.  I recommend scheduled doses today and tomorrow,  "including a slightly larger dose at bedtime to aid with sleep.  Plan: Lorazepam 1 mg at 1230, 1 mg at 1630 and 2 mg at 2100.  Tomorrow, 01/26/2022, lorazepam 1 mg three times daily.  Continue CIWA with lorazepam.  Discontinue alprazolam and recommend NOT restarting this.  Considered adding Keppra for seizure prophylaxis however, he is appearing improved from a withdrawal standpoint and the likelihood of a seizure at this point would be lower.    For questions and concerns, please page the [facility] addiction medicine provider.    Chief Complaint: abd pain and alcohol intoxication     HPI:    Javier Rivas is a 26 year old old male with a past medical history significant for anxiety, ?bipolar disorder and alcohol use disorder, alcohol withdrawal seizure who presented to [facility] for severe abd pain while intoxicated. Consultation requested for severe alcohol use disorder.     Etoh on arrival was 363. Found to have pancreatitis. Pain team consulted for abd pain. He is currently taking hydromorphone.      Thus far the daily diazepam administration (ciwa triggered) has been:  10mg  50mg  60mg  100mg  10mg thus far this morning.    He additionally received 2 mg of alprazolam for anxiety yesterday.    Patient states that he is \"angry\" that alprazolam was discontinued.  He thinks that the only medication that has helped with his anxiety and alcohol withdrawal.  Diazepam has been ineffective at controlling his symptoms and he is quite frustrated/angry that he is not given more.  He slept 4 hours last night and thinks that alprazolam is the only option to help him sleep.  He is not interested in taking gabapentin since he took 300 mg three times daily for \"years\" and it did nothing for him.  He has been on naltrexone which was helpful in controlling his alcohol use however, it caused nausea and he is not interested in restarting it or trying a acamprosate.      Opioids:  Denies illicit use    ETOH:  Patient reports that " heavy use began at the age of [age].  He went to treatment 7 months ago in [place] and had a withdrawal seizure there.  He has been sober since then until 3 days prior to admission when he began drinking vodka 750 mL daily.      Amphetamines:  Denies use    Benzodiazepines:  Denies use    Cannabis:  Patient not concerned about his use    Others (synthetic cannabinoids, hallucinogens):  Denies use    Previous treatments:  Patient reports that he has been to several treatments in the past.  He attends AA which she finds helpful.      Medical History  Past Medical History:   Diagnosis Date     Allergic state      Anxiety      Depressive disorder        Surgical History  History reviewed. No pertinent surgical history.    Social History  Employment: Works as an "[employer]"     Social History     Tobacco Use     Smoking status: Never Smoker     Smokeless tobacco: Current User   Substance Use Topics     Alcohol use: Yes     Comment: occ.       Family History  Reviewed    Family history of alcohol use disorder in a great great uncle only    Prior to Admission Medications   Medications Prior to Admission   Medication Sig Dispense Refill Last Dose     cloNIDine (CATAPRES) 0.1 MG tablet Take 0.1 mg by mouth 2 times daily as needed   Past Week at prn     fexofenadine-pseudoePHEDrine (ALLEGRA-D 24) 180-240 MG per tablet Take 1 tablet by mouth daily as needed    Past Month at prn     hydrOXYzine (ATARAX) 25 MG tablet Take 25 mg by mouth 4 times daily as needed   Past Week at prn     ondansetron (ZOFRAN-ODT) 4 MG ODT tab Place 4 mg under the tongue every 8 hours as needed   Past Week at prn     pantoprazole (PROTONIX) 40 MG EC tablet Take 40 mg by mouth daily   1/20/2022 at Unknown time     traZODone (DESYREL) 50 MG tablet Take 50 mg by mouth nightly as needed   1/20/2022 at Unknown time     zolpidem (AMBIEN) 10 MG tablet Take 10 mg by mouth nightly as needed   Past Week at prn       Allergies  No Known  Allergies       Review of Systems:  Patient reports tremor, nausea, abdominal pain, sweats, severe anxiety and panic, insomnia  Review of systems is otherwise negative      Physical Exam:    BP (!) 146/85 (BP Location: Left arm, Patient Position: Semi-Ross's)   Pulse 99   Temp 98.4  F (36.9  C) (Oral)   Resp 16   Ht 1.829 m (6')   Wt 75.4 kg (166 lb 3.2 oz)   SpO2 99%   BMI 22.54 kg/m      Standard physical exam not performed today since this encounter is via telehealth during the COVID-19 pandemic.  The exams performed by previous providers are personally reviewed in the chart.      General appearance   Gen: awake, alert, and oriented   Dermatologic   No rash. No piloerection or diaphoresis. No jaundice  HEENT   EOMI.    No yawning  Pulmonary   No respiratory distress. No cough noted.  Neurologic   Oriented to person, place, time and situation  Appears anxious    Results:    Lab Results personally reviewed    personally reviewed and shows:    Fill Date ID   Written Drug Qty Days Prescriber Rx # Pharmacy Refill   Daily Dose* Pymt Type [facility]     12/31/2021  1   12/31/2021  Oxycodone Hcl (Ir) 5 MG Tablet    10.00  2 Ne Ritchie   [id number]   [facility] ([id number])   0/0  37.50 [place]   12/30/2021  1   12/30/2021  Oxycodone Hcl (Ir) 5 MG Tablet    10.00  1 Ry Wor   [id number]   [facility] ([id number])   0/0  75.00 [place]   12/29/2021  1   12/29/2021  Zolpidem Tartrate 10 MG Tablet    10.00  10 Je Roberta   [id number]   [facility] ([id number])   0/0  0.50 [place]   12/10/2021  1   12/10/2021  Zolpidem Tartrate 10 MG Tablet    10.00  10 Honey Juan Ramon   [id number]   [facility] ([id number])   0/0  0.50 [place]   11/30/2021  1   11/29/2021  Allegra-D 24 Hour Tablet    30.00  30 Je Roberta   [id number]   [facility] (0304)   0/3   [place]   11/30/2021  1   11/29/2021  Zolpidem Tartrate 5 MG Tablet    10.00  10 Je Roberta   [id number]   [facility] ([id number])   0/0  0.25 LME            Albina Stanford MD  Addiction Medicine

## 2022-01-26 LAB
ALBUMIN SERPL-MCNC: 3.5 G/DL (ref 3.5–5)
ALP SERPL-CCNC: 40 U/L (ref 45–120)
ALT SERPL W P-5'-P-CCNC: 26 U/L (ref 0–45)
ANION GAP SERPL CALCULATED.3IONS-SCNC: 7 MMOL/L (ref 5–18)
AST SERPL W P-5'-P-CCNC: 34 U/L (ref 0–40)
BILIRUB SERPL-MCNC: 0.3 MG/DL (ref 0–1)
BUN SERPL-MCNC: 2 MG/DL (ref 8–22)
CALCIUM SERPL-MCNC: 9.1 MG/DL (ref 8.5–10.5)
CHLORIDE BLD-SCNC: 99 MMOL/L (ref 98–107)
CO2 SERPL-SCNC: 30 MMOL/L (ref 22–31)
CREAT SERPL-MCNC: 0.76 MG/DL (ref 0.7–1.3)
ERYTHROCYTE [DISTWIDTH] IN BLOOD BY AUTOMATED COUNT: 13.1 % (ref 10–15)
GFR SERPL CREATININE-BSD FRML MDRD: >90 ML/MIN/1.73M2
GLUCOSE BLD-MCNC: 90 MG/DL (ref 70–125)
HCT VFR BLD AUTO: 41.2 % (ref 40–53)
HGB BLD-MCNC: 13.5 G/DL (ref 13.3–17.7)
LIPASE SERPL-CCNC: 16 U/L (ref 0–52)
MCH RBC QN AUTO: 30.1 PG (ref 26.5–33)
MCHC RBC AUTO-ENTMCNC: 32.8 G/DL (ref 31.5–36.5)
MCV RBC AUTO: 92 FL (ref 78–100)
PLATELET # BLD AUTO: 175 10E3/UL (ref 150–450)
POTASSIUM BLD-SCNC: 4.1 MMOL/L (ref 3.5–5)
PROT SERPL-MCNC: 6.6 G/DL (ref 6–8)
RBC # BLD AUTO: 4.49 10E6/UL (ref 4.4–5.9)
SODIUM SERPL-SCNC: 136 MMOL/L (ref 136–145)
WBC # BLD AUTO: 3.7 10E3/UL (ref 4–11)

## 2022-01-26 PROCEDURE — 99232 SBSQ HOSP IP/OBS MODERATE 35: CPT | Mod: 95 | Performed by: FAMILY MEDICINE

## 2022-01-26 PROCEDURE — 83690 ASSAY OF LIPASE: CPT | Performed by: INTERNAL MEDICINE

## 2022-01-26 PROCEDURE — 250N000013 HC RX MED GY IP 250 OP 250 PS 637: Performed by: INTERNAL MEDICINE

## 2022-01-26 PROCEDURE — 120N000001 HC R&B MED SURG/OB

## 2022-01-26 PROCEDURE — 250N000011 HC RX IP 250 OP 636: Performed by: PAIN MEDICINE

## 2022-01-26 PROCEDURE — 80053 COMPREHEN METABOLIC PANEL: CPT | Performed by: INTERNAL MEDICINE

## 2022-01-26 PROCEDURE — 36415 COLL VENOUS BLD VENIPUNCTURE: CPT | Performed by: INTERNAL MEDICINE

## 2022-01-26 PROCEDURE — 250N000013 HC RX MED GY IP 250 OP 250 PS 637: Performed by: PAIN MEDICINE

## 2022-01-26 PROCEDURE — 99232 SBSQ HOSP IP/OBS MODERATE 35: CPT | Performed by: INTERNAL MEDICINE

## 2022-01-26 PROCEDURE — 250N000013 HC RX MED GY IP 250 OP 250 PS 637: Performed by: FAMILY MEDICINE

## 2022-01-26 PROCEDURE — 250N000013 HC RX MED GY IP 250 OP 250 PS 637: Performed by: STUDENT IN AN ORGANIZED HEALTH CARE EDUCATION/TRAINING PROGRAM

## 2022-01-26 PROCEDURE — 85027 COMPLETE CBC AUTOMATED: CPT | Performed by: INTERNAL MEDICINE

## 2022-01-26 PROCEDURE — 250N000013 HC RX MED GY IP 250 OP 250 PS 637: Performed by: PHYSICIAN ASSISTANT

## 2022-01-26 PROCEDURE — 99221 1ST HOSP IP/OBS SF/LOW 40: CPT | Performed by: NURSE PRACTITIONER

## 2022-01-26 PROCEDURE — 258N000003 HC RX IP 258 OP 636: Performed by: INTERNAL MEDICINE

## 2022-01-26 PROCEDURE — 250N000011 HC RX IP 250 OP 636: Performed by: STUDENT IN AN ORGANIZED HEALTH CARE EDUCATION/TRAINING PROGRAM

## 2022-01-26 PROCEDURE — C9113 INJ PANTOPRAZOLE SODIUM, VIA: HCPCS | Performed by: STUDENT IN AN ORGANIZED HEALTH CARE EDUCATION/TRAINING PROGRAM

## 2022-01-26 RX ORDER — HYDROMORPHONE HCL IN WATER/PF 6 MG/30 ML
.5-1 PATIENT CONTROLLED ANALGESIA SYRINGE INTRAVENOUS 4 TIMES DAILY PRN
Status: DISCONTINUED | OUTPATIENT
Start: 2022-01-26 | End: 2022-01-27

## 2022-01-26 RX ORDER — KETOROLAC TROMETHAMINE 30 MG/ML
15 INJECTION, SOLUTION INTRAMUSCULAR; INTRAVENOUS EVERY 6 HOURS
Status: DISCONTINUED | OUTPATIENT
Start: 2022-01-26 | End: 2022-01-26

## 2022-01-26 RX ORDER — ACETAMINOPHEN 325 MG/1
975 TABLET ORAL 3 TIMES DAILY
Status: DISCONTINUED | OUTPATIENT
Start: 2022-01-26 | End: 2022-01-28 | Stop reason: HOSPADM

## 2022-01-26 RX ORDER — TRAZODONE HYDROCHLORIDE 50 MG/1
50 TABLET, FILM COATED ORAL
Status: DISCONTINUED | OUTPATIENT
Start: 2022-01-26 | End: 2022-01-28 | Stop reason: HOSPADM

## 2022-01-26 RX ORDER — PANTOPRAZOLE SODIUM 40 MG/1
40 TABLET, DELAYED RELEASE ORAL
Status: DISCONTINUED | OUTPATIENT
Start: 2022-01-27 | End: 2022-01-27

## 2022-01-26 RX ORDER — OXYCODONE HCL 20 MG/ML
5-10 CONCENTRATE, ORAL ORAL
Status: DISCONTINUED | OUTPATIENT
Start: 2022-01-26 | End: 2022-01-26

## 2022-01-26 RX ORDER — KETOROLAC TROMETHAMINE 30 MG/ML
15 INJECTION, SOLUTION INTRAMUSCULAR; INTRAVENOUS EVERY 6 HOURS
Status: DISCONTINUED | OUTPATIENT
Start: 2022-01-26 | End: 2022-01-27

## 2022-01-26 RX ORDER — OXYCODONE HCL 20 MG/ML
5-10 CONCENTRATE, ORAL ORAL
Status: DISCONTINUED | OUTPATIENT
Start: 2022-01-26 | End: 2022-01-27

## 2022-01-26 RX ADMIN — LIDOCAINE: 50 OINTMENT TOPICAL at 21:45

## 2022-01-26 RX ADMIN — LIDOCAINE: 50 OINTMENT TOPICAL at 14:09

## 2022-01-26 RX ADMIN — HYDROMORPHONE HYDROCHLORIDE 4 MG: 2 TABLET ORAL at 01:31

## 2022-01-26 RX ADMIN — FOLIC ACID 1 MG: 1 TABLET ORAL at 08:46

## 2022-01-26 RX ADMIN — CLONIDINE HYDROCHLORIDE 0.1 MG: 0.1 TABLET ORAL at 17:31

## 2022-01-26 RX ADMIN — LIDOCAINE: 50 OINTMENT TOPICAL at 17:33

## 2022-01-26 RX ADMIN — KETOROLAC TROMETHAMINE 15 MG: 30 INJECTION, SOLUTION INTRAMUSCULAR; INTRAVENOUS at 17:19

## 2022-01-26 RX ADMIN — GABAPENTIN 600 MG: 300 CAPSULE ORAL at 14:08

## 2022-01-26 RX ADMIN — SODIUM CHLORIDE, POTASSIUM CHLORIDE, SODIUM LACTATE AND CALCIUM CHLORIDE: 600; 310; 30; 20 INJECTION, SOLUTION INTRAVENOUS at 09:27

## 2022-01-26 RX ADMIN — LORAZEPAM 1 MG: 1 TABLET ORAL at 21:36

## 2022-01-26 RX ADMIN — KETOROLAC TROMETHAMINE 15 MG: 30 INJECTION, SOLUTION INTRAMUSCULAR at 11:15

## 2022-01-26 RX ADMIN — HYDROMORPHONE HYDROCHLORIDE 1 MG: 0.2 INJECTION, SOLUTION INTRAMUSCULAR; INTRAVENOUS; SUBCUTANEOUS at 18:31

## 2022-01-26 RX ADMIN — LIDOCAINE: 50 OINTMENT TOPICAL at 08:46

## 2022-01-26 RX ADMIN — LORAZEPAM 1 MG: 1 TABLET ORAL at 14:08

## 2022-01-26 RX ADMIN — PANTOPRAZOLE SODIUM 40 MG: 40 INJECTION, POWDER, FOR SOLUTION INTRAVENOUS at 08:48

## 2022-01-26 RX ADMIN — HYDROMORPHONE HYDROCHLORIDE 4 MG: 2 TABLET ORAL at 09:10

## 2022-01-26 RX ADMIN — SODIUM CHLORIDE, POTASSIUM CHLORIDE, SODIUM LACTATE AND CALCIUM CHLORIDE: 600; 310; 30; 20 INJECTION, SOLUTION INTRAVENOUS at 21:46

## 2022-01-26 RX ADMIN — Medication 10 MG: at 17:31

## 2022-01-26 RX ADMIN — GABAPENTIN 600 MG: 300 CAPSULE ORAL at 08:45

## 2022-01-26 RX ADMIN — CLONIDINE HYDROCHLORIDE 0.1 MG: 0.1 TABLET ORAL at 08:45

## 2022-01-26 RX ADMIN — LORAZEPAM 1 MG: 1 TABLET ORAL at 07:10

## 2022-01-26 RX ADMIN — LORAZEPAM 1 MG: 1 TABLET ORAL at 08:46

## 2022-01-26 RX ADMIN — HYDROMORPHONE HYDROCHLORIDE 1 MG: 0.2 INJECTION, SOLUTION INTRAMUSCULAR; INTRAVENOUS; SUBCUTANEOUS at 12:32

## 2022-01-26 RX ADMIN — Medication 1 TABLET: at 08:45

## 2022-01-26 RX ADMIN — LORAZEPAM 2 MG: 1 TABLET ORAL at 03:09

## 2022-01-26 RX ADMIN — KETOROLAC TROMETHAMINE 15 MG: 30 INJECTION, SOLUTION INTRAMUSCULAR; INTRAVENOUS at 21:36

## 2022-01-26 RX ADMIN — NICOTINE 1 PATCH: 14 PATCH, EXTENDED RELEASE TRANSDERMAL at 08:55

## 2022-01-26 RX ADMIN — HYDROMORPHONE HYDROCHLORIDE 1 MG: 0.2 INJECTION, SOLUTION INTRAMUSCULAR; INTRAVENOUS; SUBCUTANEOUS at 22:37

## 2022-01-26 RX ADMIN — ACETAMINOPHEN 975 MG: 325 TABLET ORAL at 11:15

## 2022-01-26 RX ADMIN — ACETAMINOPHEN 975 MG: 325 TABLET ORAL at 14:08

## 2022-01-26 RX ADMIN — Medication 10 MG: at 11:26

## 2022-01-26 RX ADMIN — HYDROMORPHONE HYDROCHLORIDE 1 MG: 1 INJECTION, SOLUTION INTRAMUSCULAR; INTRAVENOUS; SUBCUTANEOUS at 07:01

## 2022-01-26 RX ADMIN — ACETAMINOPHEN 975 MG: 325 TABLET ORAL at 21:36

## 2022-01-26 RX ADMIN — CLONIDINE HYDROCHLORIDE 0.1 MG: 0.1 TABLET ORAL at 01:31

## 2022-01-26 RX ADMIN — GABAPENTIN 600 MG: 300 CAPSULE ORAL at 21:36

## 2022-01-26 RX ADMIN — HYDROMORPHONE HYDROCHLORIDE 1 MG: 1 INJECTION, SOLUTION INTRAMUSCULAR; INTRAVENOUS; SUBCUTANEOUS at 02:26

## 2022-01-26 RX ADMIN — Medication 100 MG: at 08:45

## 2022-01-26 RX ADMIN — Medication 10 MG: at 21:44

## 2022-01-26 ASSESSMENT — ACTIVITIES OF DAILY LIVING (ADL)
ADLS_ACUITY_SCORE: 3

## 2022-01-26 NOTE — PROGRESS NOTES
Daily Progress Note        CODE STATUS:  Full Code    01/26/22  Assessment/Plan:  Javier Rivas is a 26 year old old male with h/o polysubstance abuse (alcohol, cannabis), who bipolar and panic disorder, pancreatitis who presented to the hospital with the left upper quadrant pain after drinking alcohol found to have acute pancreatitis and admitted on 1/21/2022 for further management.     Acute recurrent pancreatitis; likely due to alcohol use disorder  --On 1/21, CT A/P reported edema pancreatic head and wall thickening of adjacent duodenum not excluded.  Correlate clinically for acute pancreatitis versus duodenitis  --Per reports, history of pancreatitis in the past.  Alcohol use disorder  --Mildly elevated lipase, now normal  --GI initiated clear liquid diet on 1/26, advance diet per GI team  --Taper off IV fluid gradually.  --Continue PPI  --Pain management is challenging.  Patient reports severe abdominal pain, however does not look in distress, no nausea or vomiting, not tachycardic or tachypneic.  Appreciated pain team input, patient requesting Dilaudid, I did mention to him that pain team who are specialized on managing pain adjusting medications and do not recommend changing their recommendation    Alcohol intoxication with withdrawal;  --Addiction medicine provider consulted, appreciated recommendations   --Patient not interested in naltrexone or acamprosate   --Neurontin, tapering Ativan for addiction medicine provider.  --Continue folic acid, thiamine, multivitamin  -- consulted for chemical dependency  --Patient advised to quit drinking    History of bipolar disorder/panic disorder;  --Personally discussed with psychiatry, patient declined any antipsychotic medication at this time.  Appreciated psychiatric input  --Continue clinical monitoring    Tobacco use disorder;  --On nicotine patch.  Advised to quit smoking    Renal calculus  Incidental finding on CT abdomen nonobstructive right  "renal calculus,  Follow-up as an outpatient      DVT prophylaxis; SCDs, patient ambulating     Disposition; anticipate home  Barrier to discharge; pancreatitis     LOS: 4 days     Subjective:  Interval History: Patient is new to me.  Patient seen and examined. Notes, labs, imaging reports personally reviewed.  Patient sitting at the edge of the bed, appears sedated.  When I asked how he is feeling, patient reports \"I am not feeling good, they change my pain medications, I am having 12/10 pain, I need my Dilaudid\".  At the same time, patient reports he wants to drink sera mist and hoping to go home tomorrow.  I did mention to patient that I am not going to change his pain regimen and defer to pain team and will follow their recommendation.  Discussed with nursing staff multiple times.  Upon chart review, pain medication adjusted pain medication, initiated on Tylenol, IV Toradol and tapering off IV Dilaudid.    Review of Systems:   As mentioned in subjective.    Patient Active Problem List   Diagnosis     Major depression     Acute pancreatitis     Nausea     Pancreatitis     Duodenitis     Alcohol use disorder, severe, dependence (H)     LUQ abdominal pain     Alcohol withdrawal syndrome (H)     Opioid use disorder, severe, dependence (H)       Scheduled Meds:    acetaminophen  975 mg Oral TID     cloNIDine  0.1 mg Oral Q8H     folic acid  1 mg Oral Daily     gabapentin  600 mg Oral TID     ketorolac  15 mg Intravenous Q6H     lidocaine   Topical 4x Daily     LORazepam  1 mg Oral TID     multivitamin w/minerals  1 tablet Oral Daily     nicotine  1 patch Transdermal Daily     nicotine   Transdermal Q8H     pantoprazole (PROTONIX) IV  40 mg Intravenous Daily with breakfast     Continuous Infusions:    lactated ringers 100 mL/hr at 01/26/22 0927     PRN Meds:.acetaminophen **OR** acetaminophen, flumazenil, OLANZapine zydis **OR** haloperidol lactate, hydrALAZINE, HYDROmorphone, lidocaine viscous 2% 15 mL and " maalox/mylanta w/ simethicone 15 mL (GI COCKTAIL), LORazepam **OR** LORazepam, melatonin, naloxone **OR** naloxone **OR** naloxone **OR** naloxone, ondansetron **OR** ondansetron, oxyCODONE, traZODone    Objective:  Vital signs in last 24 hours:  Temp:  [97.6  F (36.4  C)-97.8  F (36.6  C)] 97.7  F (36.5  C)  Pulse:  [70-93] 70  Resp:  [16-18] 18  BP: (125-143)/(72-90) 135/72  SpO2:  [97 %-100 %] 97 %      Intake/Output Summary (Last 24 hours) at 1/26/2022 1142  Last data filed at 1/26/2022 0655  Gross per 24 hour   Intake 2720 ml   Output --   Net 2720 ml       Physical Exam:  General: Not in obvious distress.  HEENT: Normocephalic, supple neck  Chest: Clear to auscultation bilateral anteriorly, no wheezing  Heart: S1S2 normal, regular  Abdomen: Soft.  Tenderness on palpation. Bowel sounds- active.  Extremities: No legs swelling  Neuro: Appears sedated, follows simple commands appropriately, moves all extremities      Lab Results:(I have personally reviewed the results)    Recent Results (from the past 24 hour(s))   CBC with platelets    Collection Time: 01/26/22  6:59 AM   Result Value Ref Range    WBC Count 3.7 (L) 4.0 - 11.0 10e3/uL    RBC Count 4.49 4.40 - 5.90 10e6/uL    Hemoglobin 13.5 13.3 - 17.7 g/dL    Hematocrit 41.2 40.0 - 53.0 %    MCV 92 78 - 100 fL    MCH 30.1 26.5 - 33.0 pg    MCHC 32.8 31.5 - 36.5 g/dL    RDW 13.1 10.0 - 15.0 %    Platelet Count 175 150 - 450 10e3/uL   Comprehensive metabolic panel    Collection Time: 01/26/22  6:59 AM   Result Value Ref Range    Sodium 136 136 - 145 mmol/L    Potassium 4.1 3.5 - 5.0 mmol/L    Chloride 99 98 - 107 mmol/L    Carbon Dioxide (CO2) 30 22 - 31 mmol/L    Anion Gap 7 5 - 18 mmol/L    Urea Nitrogen 2 (L) 8 - 22 mg/dL    Creatinine 0.76 0.70 - 1.30 mg/dL    Calcium 9.1 8.5 - 10.5 mg/dL    Glucose 90 70 - 125 mg/dL    Alkaline Phosphatase 40 (L) 45 - 120 U/L    AST 34 0 - 40 U/L    ALT 26 0 - 45 U/L    Protein Total 6.6 6.0 - 8.0 g/dL    Albumin 3.5 3.5 - 5.0  g/dL    Bilirubin Total 0.3 0.0 - 1.0 mg/dL    GFR Estimate >90 >60 mL/min/1.73m2   Lipase    Collection Time: 01/26/22  6:59 AM   Result Value Ref Range    Lipase 16 0 - 52 U/L         Serum Glucose range:   Recent Labs   Lab 01/26/22  0659 01/23/22  1018 01/21/22  2141   GLC 90 69* 94     ABG: No lab results found in last 7 days.  CBC:   Recent Labs   Lab 01/26/22  0659 01/23/22  1018 01/21/22  2141   WBC 3.7* 5.0 6.8   HGB 13.5 13.2* 15.9   HCT 41.2 39.9* 47.3   MCV 92 91 90    171 289   NEUTROPHIL  --  75 52   LYMPH  --  10 37   MONOCYTE  --  14 9   EOSINOPHIL  --  1 1     Chemistry:   Recent Labs   Lab 01/26/22  0659 01/25/22  0706 01/24/22  0753 01/23/22  1739 01/23/22 1018 01/21/22 2141     --   --   --  135* 144   POTASSIUM 4.1 3.7 3.5   < > 3.4* 3.9   CHLORIDE 99  --   --   --  99 104   CO2 30  --   --   --  24 25   BUN 2*  --   --   --  3* 11   CR 0.76  --   --   --  0.69* 0.80   GFRESTIMATED >90  --   --   --  >90 >90   SOUTH 9.1  --   --   --  8.6 9.2   MAG  --   --   --   --   --  1.9   PROTTOTAL 6.6  --   --   --  6.3 8.2*   ALBUMIN 3.5  --   --   --  3.6 4.6   AST 34  --   --   --  32 39   ALT 26  --   --   --  25 39   ALKPHOS 40*  --   --   --  46 58   BILITOTAL 0.3  --   --   --  0.9 0.4    < > = values in this interval not displayed.     Coags:  No results for input(s): INR, PROTIME, PTT in the last 168 hours.    Invalid input(s): APTT  Cardiac Markers:  Recent Labs   Lab 01/21/22 2141   TROPONINI <0.01        CT Abdomen Pelvis w Contrast    Result Date: 1/21/2022  EXAM: CT ABDOMEN PELVIS W CONTRAST LOCATION: [facility] DATE/TIME: 1/21/2022 10:35 PM INDICATION: Left lower quadrant pain. COMPARISON: 12/30/2021 TECHNIQUE: CT scan of the abdomen and pelvis was performed following injection of IV contrast. Multiplanar reformats were obtained. Dose reduction techniques were used. CONTRAST: isovue 370 100ml FINDINGS: LOWER CHEST: Lung bases clear. HEPATOBILIARY:  Hepatic steatosis. PANCREAS: Edema of the pancreatic head wall thickening of adjacent duodenum not excluded. SPLEEN: Normal. ADRENAL GLANDS: Normal. KIDNEYS/BLADDER: Subcentimeter renal hypodensities small for characterization. Nonobstructing right renal calculus. BOWEL: Underdistention of the colon reduces evaluation for wall thickening/colitis. The appendix is normal. Bowel is normal caliber. LYMPH NODES: Normal. VASCULATURE: Unremarkable. PELVIC ORGANS: Prostate calcification. MUSCULOSKELETAL: Normal.     IMPRESSION: 1.  Edema of the pancreatic head and wall thickening of the adjacent duodenum not excluded. Correlate clinically for pancreatitis versus duodenitis. 2.  Colon is underdistended reducing evaluation for wall thickening/early changes of colitis. 3.  Nonobstructing right renal calculus. 4.  Fatty liver. 5.  Normal appendix.          Latest radiology report personally reviewed.    Note created using dragon voice recognition software so sounds alike errors may have escaped editing.    01/26/2022   ZEKE ANTHONY MD  HOSPITALIST, [facility]  PAGER NO. [phone]

## 2022-01-26 NOTE — PROGRESS NOTES
Care Management Follow Up    Length of Stay (days): 4    Expected Discharge Date: 01/27/2022     Concerns to be Addressed:     Medical progression-Pain control, diet tolerance  Patient plan of care discussed at interdisciplinary rounds: Yes    Anticipated Discharge Disposition:  Home      Anticipated Discharge Services:  None anticipated   Anticipated Discharge DME:  None anticipated     Patient/family educated on Medicare website which has current facility and service quality ratings:  Not needed at this time  Education Provided on the Discharge Plan:  yes  Patient/Family in Agreement with the Plan:  yes    Referrals Placed by CM/SW:  None   Private pay costs discussed: Not applicable    Additional Information:  From home with grandparents; independent at baseline. No needs anticipated. Met with patient in room to see if he would be interested in any chemical dependency resources/Rule 25 information, patient stated he would be. Reached out to social work partner who has attached Rule 25 information to discharge instructions. Family to transport. Care manager to follow.       Pavithra Ulrich RN

## 2022-01-26 NOTE — DISCHARGE INSTRUCTIONS
Substance Use Treatment (Rule 25) Information -     To access chemical dependency treatment you must have an assessment complete or have an updated assessment within 30 days of starting any program.    Information for this to be completed and to secure funding if you have medical assistance or no insurance can be found through your [facility]'s chemical health intake line. Chemical intake lines can be found below.    [facility] RULE 25 INFORMATION -   [place] - [phone]  [place] - [phone]  [place]  [phone]  [facility] [phone]  [facility] [phone]  [place] - [phone]  [place] - [phone]  [facility] [phone]     If you have private insurance contact the customer service number on the back of your insurance card to determine the required steps for accessing services through your insurance provider.     Once approved for funding you can connect with a facility that does Rule 25 assessment. Treatment locations can be discussed with a Rule 25 , known as a Licensed Drug and Alcohol Counselor (LADC). They will send the completed Rule 25 to the treatment facility of choice.    The following facilities offer Rule 25 assessments -     [facility]  [phone]  [facility]   [address] [facility] [address]  [facility]  [phone]  [address] [place] [address]   [facility]  [phone]  [facility] [address] [facility]   [phone]  [facility]  [address] [facility] [address]   [facility]   [phone]  [address] [place] [address] [facility]  [phone] - press 1  Multiple clinic locations   [facility]   [phone]  [address] [facility] [address] [facility]  [phone]  [address] [facility] [address]   [facility]  [phone]  [address]    [facility] [address] [place]  [phone] [address] [facility] [address]  [place] [address]       If you are intoxicated  You may be required to detox at a detox facility before starting treatment.    [facility]- [address] [place] [address].  [place], [place].    [phone]    [facility]: [phone]  [facility]: [phone]  [facility]: [phone]      *All information subject to change by facility.

## 2022-01-26 NOTE — PROGRESS NOTES
"[facility] ACUTE PAIN SERVICE    ([facility], [facility], [facility])  Daily PAIN Progress Note     Assessment/Plan:    Javier Rivas is a 26 year old male who was admitted on 1/21/2022. Pain Service is asked to see the patient for pain management. Admitted for evaluation of abdominal pain found to have pancreatitis. History of alcohol dependence, duodenitis, anxiety, bipolar disorder. Patient with alcohol use disorder previous withdrawal symptoms no seizures.  [facility] Consult: \"The patient doesn't smoke tobacco does vape, and regular marijuana use. He denies chronic abdominal pain.  Imaging:  Edema of the pancreatic head wall thickening of adjacent duodenum not excluded, fatty liver. Recommend: smoking and alcohol cessation, IVF, Pain Control, now clear liquid diet\"    Diet is NPO. Pt seen by chem dep: which reported his disintrerest naltrexone or acamprosate, not interested in treatment since he reports he relapsed for only 3 days. Patient reports that heavy use began at the age of [age].  He went to treatment 7 months ago in [place] and had a withdrawal seizure there.  He has been sober since then until 3 days prior to admission when he began drinking vodka 750 mL daily.\"    Patient reporting pain level 6/10 after receiving IV dilaudid during assessment this morning. Concerned about lowering of IV dilaudid frequency today to qid prn, maximizing multimodal therapy, denies nausea/vomitting, states he would rather not eat and have IV dilaudid for pain. Pain localized to area of pancreas described as sharp stabbing. Pt very anxious during assessment and asking about drug names, doses, and frequencies. Pt reports he wants to be home by tomorrow.         PLAN:   1) Pain is consistent with acute abdominal pain associated with pancreatitis.  Lipase is WNL today = 16.   2)Multimodal Medication Therapy  Topical: none for now  NSAID: Toradol 15 mg every 6 hours times for 4 doses - has completed 4 " doses.   Muscle Relaxants: none  Adjuvants: gabapentin 300 mg tid - increased to gabapentin 600 mg po BID by primary, scheduled APAP 975 mg po TID  Antidepressants/anxiolytics: valium per CIWA  Opioids:change Dilaudid 2-4mg q4hprn - to concentrated oxycodone solution 5-10 mg po q3h prn SL first line  IV Pain medication:  Hydromorphone 0.5-1 mg qid prn - continue to taper off   3)Non-medication interventions  Pharmacy consult- appreciate recommendations   Acupuncture consult- as available Mon and Thursday    Integrative consult - called referral to [phone]   4)Constipation Prophylaxis  Daily stool softener/laxative as needed  5) Follow up   -Opioid prescriber has been none  -Discharge Recommendations - We recommend prescribing the following at the time of discharge: Opioids not recommended for ongoing therapy if pancreatitis has resolved d/t high risk for ongoing substance abuse and refusal of any alchohol substancance treatment or other treatment modalities for etoh. If ordered would not exceed 10 tabs.     Bhavya Eldridge, PharmD, BCPS, CPE  Acute Care Pain Management Program  [facility] ([facility], [facility], [facility])   Page via [facility]- Click HERE to page Sweta or call [phone]

## 2022-01-26 NOTE — PROGRESS NOTES
Addiction Medicine Progress Note  1/26/2022      Tele-Visit Details     Type of service:  Video Visit  Video Start Time (time video started): 1232  Video End Time (time video stopped): 1245  Originating Location (pt. Location): [facility], patient's room  Distant Location (provider location): private home office  Reason for Televisit: COVID 19   Mode of Communication:  Video Conference via polycom  Physician has received verbal consent for a video visit from the patient? Yes      Assessment/Plan    Active Problems:    Acute pancreatitis    Nausea    Duodenitis    Alcohol use disorder, severe, dependence (H)    LUQ abdominal pain    Alcohol withdrawal syndrome (H)    Opioid use disorder, severe, dependence (H)    LUQ abdominal pain- noted pain team recommendations and agree with reducing opioids.  He appeared mildly sedated on exam and proceeded to ask for more hydromorphone and lorazepam.  Agree with avoiding large rx for opiates at discharge. Patient at very high risk for additional substance use disorders. Untreated pain also creates high risk for relapse to alcohol so withholding opiates may not be appropriate upon discharge--agree with no more than #10.       Alcohol use disorder, severe, dependence with acute uncomplicated alcohol withdrawal- patient with history of withdrawal seizure. CIWA triggered benzodiazepine administration peaked on 01/24/2022.  Appears sedated and CIWA scores elevated for more of the subjective findings.  Plan: Continue with the increased dose of gabapentin 600 mg 3 times daily, discontinue CIWA and associated lorazepam orders.  Continue with the taper of lorazepam and add 1 more dose tomorrow morning.  He will therefore receive 3 additional doses.        Subjective  Patient reports severe anxiety and abdominal pain.  He is asking for more hydromorphone and lorazepam.  He is worried that he will be discharged without opioid pain medications and have to return to the hospital.  He  states that he needs to leave the hospital by tomorrow night at midnight but does not give a reason.    ROS:    No vomiting  Heightened anxiety  Reports tremor    Objective    Vital signs in last 24 hours  Temp:  [97.6  F (36.4  C)-98.9  F (37.2  C)] 98.9  F (37.2  C)  Pulse:  [70-93] 79  Resp:  [16-18] 18  BP: (125-145)/(72-92) 145/92  SpO2:  [95 %-100 %] 95 %        Physical Exam    Notes of previous providers and nursing staff reviewed for the purpose of the this telehealth visit.    Gen: Awake but appears mildly sedated. In no acute distress.   Anxiously requesting medications  Neuro: speech is normal  Tremor: none noted although patient reports tremor  Eyes: EOMI. There is no scleral icterus.  Skin: no jaundice, diaphoresis, goose bumps  Respiratory: no cough, breathing is non-labored      Pertinent Labs   Lab Results: I have personally reviewed the labs. Lipase now wnl, cbc and cmp unremarkable      Albina Stanford MD  Addiction Medicine

## 2022-01-26 NOTE — CONSULTS
INITIAL PSYCHIATRIC CONSULTATION                  REASON FOR REQUEST: bipolar disorder/panic disorder, worsening behavioral issues      ASSESSMENT/RECOMMENDATIONS/PLAN :   Mood changes in the context of alcoholism medical condition, acute pancreatitis.  Alcoholism, abuse, intoxication.  Substance-induced mood disorder cannot be excluded in the setting of long-term alcohol abuse and cannabis abuse.  Patient is not using any cannabis currently.  History of bipolar affective disorder mixed diagnosed at age 21.  Sleep difficulties, history of insomnia.      Recommendations:  Patient is declining to take any kind of psychotropics and thus would not schedule medications without his consent however discussed the following medications with him: Abilify 5 mg in the morning.  Seroquel 50 mg 4 times a day as needed for anxiety.  Seroquel 100 mg at bedtime for sleep.  Will recommend patient going to rule 25 assessment as well as following up with psychiatric providers on an outpatient basis.  I will defer benzodiazepine or any other medications prescribing to the hospitalist or addiction medicine.  Patient is stable to discharge from psychiatric standpoint.   I will sign off.    MENTAL STATUS EXAMINATION:   General Appearance: Not in acute distress, resting comfortably in bed.    Behavior: Good eye contact, no bizarre ideations  Speech: Coherent.  Thought Process: Linear, clear.  Thought content: No evidence of hallucinations, delusions or paranoia.    Thought Formation: Associations are connected  Judgment: Fair  Insight : Intact  Attention : Adequate  Memory: Sufficient  Fund Of Knowledge: Average  Affect: Neutral  Mood: Congruent  Alert : Awake  Suicidal ideation: Absent  Homicidal ideation: Absent  Orientation: X 4  Comprehension: Sufficient pertaining to current medical needs  Generative thought content: Adequate.  Spontaneous conversation  Language: Intact  Gait and Ambulation: Gait and ambulation at baseline.   "  Musculoskeletal: No tonal abnormalities      /72 (BP Location: Left arm)   Pulse 70   Temp 97.7  F (36.5  C) (Oral)   Resp 18   Ht 1.829 m (6')   Wt 75.4 kg (166 lb 3.2 oz)   SpO2 97%   BMI 22.54 kg/m        HISTORY OF PRESENT ILLNESS:   Presenting history to include[place] [facility] [place]   Javier Rivas  26-year-old male with history of alcohol abuse, bipolar and panic disorders presented with abdominal pain.  Reports started to have pain a day prior  around epigastrium and left upper quadrant region.  Also reports associated nausea but no vomiting.  Had previous episodes of pancreatitis after which he quit alcohol for few months and started drinking again recently.  He drinks about 750 mL of vodka every day for the last few weeks, reports history of withdrawal seizures in the past, denies any chest pain, shortness of breath, cough, diarrhea, fever or chills.  Upon eval at ED noted to be tachycardic, with unremarkable labs except for mild elevation of lipase,but CT abdomen showing pancreatic edema consistent with pancreatitis.  Started on IV fluids, CIWA protocol, pain control and admitted for further management.    Upon assessment, patient was noted to be seated upright in bed, texting someone on his phone.  He engaged in a coherent and reliable conversation.  He acknowledges consumption of alcohol and a history of anxiety, panic and insomnia.  He repeatedly mentioned that Xanax had helped him \"a lot sleeping and for the first time I slept for several hours thus only medication works for me\".  He denied a diagnosis of bipolar affective disorder as noted in chart saying that \"that is a misdiagnosis\".  He reported of anxiety and panic symptoms however denied any hallucinations, delusions apparently.  He denied ever having symptoms consistent with vineet or hypomania.  His last psychiatric inpatient hospitalization in 2015 was due to panic attacks and was started on Depakote which he said that he did " "not find helpful.  Patient was residing in [place] where he was using cannabis to fall asleep and found that effective.  After living in [place] for 7 years he moved to [place] to be around \"family because I need help to stay sober\".  He denied any thoughts of self-harm or suicidality.  Denied any apathy, anhedonia, lack of motivation or lack of interest.  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  Unclear what kind of programming he was talking about whether security or writing codes or computers.  Patient kept repeatedly asking for Xanax.  I asked Mr. Hernandez if he had an outpatient Xanax prescriber and he said no.  I explained to him that it would be difficult to give an scheduled benzodiazepine in hospital because he would need someone on an outpatient basis to continue to prescribe any benzodiazepine to avoid complications and withdrawal and if he did not have one on an outpatient basis then it would not be safe for him to be placed on a scheduled Xanax or Ativan or Valium.  Patient kept saying that he would be thankful to just get \"just few doses\".  He is also requesting to get 2 mg of IV Dilaudid to target his pain due to pancreatitis.  He further emphasizes that he is not interested in starting any antidepressant or psychotropics.    Review of Systems:As per HPI. Remainders of 12 point review of systems negative.  Psychiatric ROS:  Change of Interest/Anhedonia:  No  Appetite/Weight Changes: No  Concentration Changes:No  Impaired Energy:No  Impaired Sleep:No  Anxiety/Panic:Yes  Tearfulness:No  Depression:No  Psychosis: No  Irritability:No  SI/VI/HI: No,No,No  Marichuy: No            PFSH reviewed  and not pertinent to chief complaint/reason for visit  /72 (BP Location: Left arm)   Pulse 70   Temp 97.7  F " (36.5  C) (Oral)   Resp 18   Ht 1.829 m (6')   Wt 75.4 kg (166 lb 3.2 oz)   SpO2 97%   BMI 22.54 kg/m    Alcohol, Blood (mg/dL)   Date Value   01/21/2022 363 (H)     @24HOURRESULTS@  Recent Results (from the past 72 hour(s))   Comprehensive metabolic panel    Collection Time: 01/23/22 10:18 AM   Result Value Ref Range    Sodium 135 (L) 136 - 145 mmol/L    Potassium 3.4 (L) 3.5 - 5.0 mmol/L    Chloride 99 98 - 107 mmol/L    Carbon Dioxide (CO2) 24 22 - 31 mmol/L    Anion Gap 12 5 - 18 mmol/L    Urea Nitrogen 3 (L) 8 - 22 mg/dL    Creatinine 0.69 (L) 0.70 - 1.30 mg/dL    Calcium 8.6 8.5 - 10.5 mg/dL    Glucose 69 (L) 70 - 125 mg/dL    Alkaline Phosphatase 46 45 - 120 U/L    AST 32 0 - 40 U/L    ALT 25 0 - 45 U/L    Protein Total 6.3 6.0 - 8.0 g/dL    Albumin 3.6 3.5 - 5.0 g/dL    Bilirubin Total 0.9 0.0 - 1.0 mg/dL    GFR Estimate >90 >60 mL/min/1.73m2   CBC with platelets and differential    Collection Time: 01/23/22 10:18 AM   Result Value Ref Range    WBC Count 5.0 4.0 - 11.0 10e3/uL    RBC Count 4.37 (L) 4.40 - 5.90 10e6/uL    Hemoglobin 13.2 (L) 13.3 - 17.7 g/dL    Hematocrit 39.9 (L) 40.0 - 53.0 %    MCV 91 78 - 100 fL    MCH 30.2 26.5 - 33.0 pg    MCHC 33.1 31.5 - 36.5 g/dL    RDW 13.1 10.0 - 15.0 %    Platelet Count 171 150 - 450 10e3/uL    % Neutrophils 75 %    % Lymphocytes 10 %    % Monocytes 14 %    % Eosinophils 1 %    % Basophils 0 %    % Immature Granulocytes 0 %    NRBCs per 100 WBC 0 <1 /100    Absolute Neutrophils 3.7 1.6 - 8.3 10e3/uL    Absolute Lymphocytes 0.5 (L) 0.8 - 5.3 10e3/uL    Absolute Monocytes 0.7 0.0 - 1.3 10e3/uL    Absolute Eosinophils 0.1 0.0 - 0.7 10e3/uL    Absolute Basophils 0.0 0.0 - 0.2 10e3/uL    Absolute Immature Granulocytes 0.0 <=0.4 10e3/uL    Absolute NRBCs 0.0 10e3/uL   Lipase    Collection Time: 01/23/22 10:18 AM   Result Value Ref Range    Lipase 360 (H) 0 - 52 U/L   Potassium    Collection Time: 01/23/22  5:39 PM   Result Value Ref Range    Potassium 3.4 (L) 3.5 -  5.0 mmol/L   Potassium    Collection Time: 01/23/22 11:34 PM   Result Value Ref Range    Potassium 3.8 3.5 - 5.0 mmol/L   Potassium    Collection Time: 01/24/22  7:53 AM   Result Value Ref Range    Potassium 3.5 3.5 - 5.0 mmol/L   Extra Purple Top Tube    Collection Time: 01/24/22  7:53 AM   Result Value Ref Range    Hold Specimen JIC    Potassium    Collection Time: 01/25/22  7:06 AM   Result Value Ref Range    Potassium 3.7 3.5 - 5.0 mmol/L   CBC with platelets    Collection Time: 01/26/22  6:59 AM   Result Value Ref Range    WBC Count 3.7 (L) 4.0 - 11.0 10e3/uL    RBC Count 4.49 4.40 - 5.90 10e6/uL    Hemoglobin 13.5 13.3 - 17.7 g/dL    Hematocrit 41.2 40.0 - 53.0 %    MCV 92 78 - 100 fL    MCH 30.1 26.5 - 33.0 pg    MCHC 32.8 31.5 - 36.5 g/dL    RDW 13.1 10.0 - 15.0 %    Platelet Count 175 150 - 450 10e3/uL   Comprehensive metabolic panel    Collection Time: 01/26/22  6:59 AM   Result Value Ref Range    Sodium 136 136 - 145 mmol/L    Potassium 4.1 3.5 - 5.0 mmol/L    Chloride 99 98 - 107 mmol/L    Carbon Dioxide (CO2) 30 22 - 31 mmol/L    Anion Gap 7 5 - 18 mmol/L    Urea Nitrogen 2 (L) 8 - 22 mg/dL    Creatinine 0.76 0.70 - 1.30 mg/dL    Calcium 9.1 8.5 - 10.5 mg/dL    Glucose 90 70 - 125 mg/dL    Alkaline Phosphatase 40 (L) 45 - 120 U/L    AST 34 0 - 40 U/L    ALT 26 0 - 45 U/L    Protein Total 6.6 6.0 - 8.0 g/dL    Albumin 3.5 3.5 - 5.0 g/dL    Bilirubin Total 0.3 0.0 - 1.0 mg/dL    GFR Estimate >90 >60 mL/min/1.73m2   Lipase    Collection Time: 01/26/22  6:59 AM   Result Value Ref Range    Lipase 16 0 - 52 U/L       PMH:   Past Medical History:   Diagnosis Date     Allergic state      Anxiety      Depressive disorder            Current Medications:Scheduled Meds:    cloNIDine  0.1 mg Oral Q8H     folic acid  1 mg Oral Daily     gabapentin  600 mg Oral TID     lidocaine   Topical 4x Daily     LORazepam  1 mg Oral TID     multivitamin w/minerals  1 tablet Oral Daily     nicotine  1 patch Transdermal Daily      nicotine   Transdermal Q8H     pantoprazole (PROTONIX) IV  40 mg Intravenous Daily with breakfast     thiamine  100 mg Oral Daily     Continuous Infusions:    lactated ringers 100 mL/hr at 01/25/22 2306     PRN Meds:.acetaminophen **OR** acetaminophen, flumazenil, OLANZapine zydis **OR** haloperidol lactate, hydrALAZINE, HYDROmorphone, HYDROmorphone, lidocaine viscous 2% 15 mL and maalox/mylanta w/ simethicone 15 mL (GI COCKTAIL), LORazepam **OR** LORazepam, melatonin, naloxone **OR** naloxone **OR** naloxone **OR** naloxone, ondansetron **OR** ondansetron, traZODone                Family History: PERSONALLY REVIEWED.History reviewed. No pertinent family history.  Pertinent Family hx not pertinent to Chief Complaint or reason for visit.     Social History:  PERSONALLY REVIEWED.  Social History     Socioeconomic History     Marital status: Single     Spouse name: Not on file     Number of children: Not on file     Years of education: Not on file     Highest education level: Not on file   Occupational History     Not on file   Tobacco Use     Smoking status: Never Smoker     Smokeless tobacco: Current User   Substance and Sexual Activity     Alcohol use: Yes     Comment: occ.     Drug use: Yes     Types: Marijuana     Sexual activity: Not on file   Other Topics Concern     Not on file   Social History Narrative     Not on file     Social Determinants of Health     Financial Resource Strain: Not on file   Food Insecurity: Not on file   Transportation Needs: Not on file   Physical Activity: Not on file   Stress: Not on file   Social Connections: Not on file   Intimate Partner Violence: Not on file   Housing Stability: Not on file    not pertinent to Chief Complaint or reason for visit.             Allergies as of 06/01/2014 Reviewed     Review of Systems:As per HPI. Remainders of 12 point review of systems negative.    Review of Pertinent Laboratory:      PERSONALLY REVIEWED.    Physical Exam: Temp:  [97.6  F (36.4   C)-98.4  F (36.9  C)] 97.7  F (36.5  C)  Pulse:  [] 70  Resp:  [16-18] 18  BP: (125-146)/(72-90) 135/72  SpO2:  [95 %-100 %] 97 %   Vitals: reviewed in chart     Physical exam as per medical team: reviewed in chart      diagnoses, risk and benefits of medications discussed with staff. Care coordination with care management team.   Thank you for this consultation.       Yolanda Mercer; NP  Mental health & Addiction Services        This note was created with the help of Dragon dictation system. Grammatical and typing errors are not intentional.

## 2022-01-26 NOTE — PLAN OF CARE
Problem: Substance Misuse (Alcohol Withdrawal)  Goal: Readiness for Change Identified  Outcome: No Change   Patient anxious about getting next PRN dilaudid (PO and IV). Patient education provided about dilaudid given as PRN and not scheduled. Patient set alarm clock to get next IV dilaudid anyway. Patient rating pain 10/10; patient sleeping upon reassessment x 3. Otherwise, rated pain 4/10 upon reassessment but patient added that pain comes back right away. No guarding or moaning noted.     Problem: Alcohol Withdrawal  Goal: Alcohol Withdrawal Symptom Control  Outcome: Improving   Patient scored 7 & 4 on CIWA. Up independently in room.

## 2022-01-26 NOTE — PLAN OF CARE
Problem: Alcohol Withdrawal  Goal: Alcohol Withdrawal Symptom Control  Outcome: No Change   He continues to rate his abdominal pain a 9-10 tho he appears much more comfortable.  He had 4mg of oral dilaudid at 0130 for abdominal pain of 10.  He wanted IV dilaudid at that time too, and was upset about not getting it then.  He also wanted ativan tho his CIWA at that time was a 6.  He said my score wasn't accurate because he had extreme sweating, headache, nausea and anxiety.  Oral ativan given at 03 for score of 13.  On recheck at 0330 he was a 7.

## 2022-01-26 NOTE — PROGRESS NOTES
[facility] Digestive Health Progress Note    Subjective:  Pt reports overall mild improvement in abdominal pain. Anticipating PO oxycodone and IV Dilaudid as needed. NPO last night due to ongoing pain. Requesting liquids. Denies nausea, emesis, fever, SOB.     Objective:  Vital signs in last 24 hours  Temp:  [97.6  F (36.4  C)-98.9  F (37.2  C)] 98.9  F (37.2  C)  Pulse:  [70-93] 79  Resp:  [16-18] 18  BP: (125-145)/(72-92) 145/92  SpO2:  [95 %-100 %] 95 % O2 Device: None (Room air)      Gen: A&Ox3, NAD, lethargic  Eyes: No icterus  Gastrointestinal: Soft, Non-distended, TTP in epigastrium and LUQ, +guarding with mild rebound  Extrem: PPI, no c/c/e      LABORATORY    ELECTROLYTE PANEL   Recent Labs   Lab 01/26/22  0659 01/25/22  0706 01/24/22  0753 01/23/22  1739 01/23/22  1018 01/21/22  2141     --   --   --  135* 144   POTASSIUM 4.1 3.7 3.5   < > 3.4* 3.9   CHLORIDE 99  --   --   --  99 104   CO2 30  --   --   --  24 25   GLC 90  --   --   --  69* 94   CR 0.76  --   --   --  0.69* 0.80   BUN 2*  --   --   --  3* 11    < > = values in this interval not displayed.      HEMATOLOGY PANEL   Recent Labs   Lab 01/26/22  0659 01/23/22  1018 01/21/22  2141   HGB 13.5 13.2* 15.9   MCV 92 91 90   WBC 3.7* 5.0 6.8    171 289      LIVER AND PANCREAS PANEL   Recent Labs   Lab 01/26/22  0659 01/23/22  1018 01/21/22  2141   AST 34 32 39   ALT 26 25 39   ALKPHOS 40* 46 58   BILITOTAL 0.3 0.9 0.4   LIPASE 16 360* 70*     IMAGING STUDIES    No results found.    I have reviewed the current diagnostic and laboratory tests.                Assessment:  1. Recurrent EtOH pancreatitis - chronic ETOH and tobacco abuse; intoxicated on admission. Stable. Pain controlled. Tolerating liquids. Clinically improving. Concern for overmedication/sedation.  2. EtOH abuse.           Patient Active Problem List   Diagnosis     Major depression     Acute pancreatitis     Nausea     Pancreatitis     Duodenitis     Alcohol dependence with  uncomplicated intoxication (H)     LUQ abdominal pain         Plan:  -Clear liquid diet. If able to achieve pain control on oral medications alone, may advance to low fat diet this evening.  -Discontinue EtOH withdrawal protocol; >72-96 hr since last drink.  -IVF TKO  -Analgesia as needed. Appreciate pain management consultation. Transition to oral analgesia.  -Continue pantoprazole 40 mg daily as inpt.  -Chem dep evaluation.    Approximately 15 minutes of total time was spent providing patient care including patient evaluation, reviewing documentation/test results, and .                                                  Saul Davis MD  Thank you for the opportunity to participate in the care of this patient.   Please feel free to call me with any questions or concerns.  Phone number [phone].

## 2022-01-27 ENCOUNTER — APPOINTMENT (OUTPATIENT)
Dept: CT IMAGING | Facility: HOSPITAL | Age: 27
DRG: 439 | End: 2022-01-27
Attending: INTERNAL MEDICINE
Payer: MEDICAID

## 2022-01-27 VITALS
BODY MASS INDEX: 23.42 KG/M2 | RESPIRATION RATE: 18 BRPM | TEMPERATURE: 97.3 F | HEART RATE: 79 BPM | SYSTOLIC BLOOD PRESSURE: 127 MMHG | OXYGEN SATURATION: 96 % | DIASTOLIC BLOOD PRESSURE: 80 MMHG | WEIGHT: 172.9 LBS | HEIGHT: 72 IN

## 2022-01-27 PROCEDURE — 74177 CT ABD & PELVIS W/CONTRAST: CPT

## 2022-01-27 PROCEDURE — 250N000011 HC RX IP 250 OP 636: Performed by: INTERNAL MEDICINE

## 2022-01-27 PROCEDURE — 120N000001 HC R&B MED SURG/OB

## 2022-01-27 PROCEDURE — 99232 SBSQ HOSP IP/OBS MODERATE 35: CPT | Performed by: INTERNAL MEDICINE

## 2022-01-27 PROCEDURE — 250N000011 HC RX IP 250 OP 636: Performed by: PAIN MEDICINE

## 2022-01-27 PROCEDURE — 250N000013 HC RX MED GY IP 250 OP 250 PS 637: Performed by: STUDENT IN AN ORGANIZED HEALTH CARE EDUCATION/TRAINING PROGRAM

## 2022-01-27 PROCEDURE — 99233 SBSQ HOSP IP/OBS HIGH 50: CPT | Mod: 95 | Performed by: INTERNAL MEDICINE

## 2022-01-27 PROCEDURE — 250N000013 HC RX MED GY IP 250 OP 250 PS 637: Performed by: INTERNAL MEDICINE

## 2022-01-27 PROCEDURE — 258N000003 HC RX IP 258 OP 636: Performed by: INTERNAL MEDICINE

## 2022-01-27 PROCEDURE — 250N000013 HC RX MED GY IP 250 OP 250 PS 637: Performed by: PHYSICIAN ASSISTANT

## 2022-01-27 PROCEDURE — 250N000013 HC RX MED GY IP 250 OP 250 PS 637: Performed by: PAIN MEDICINE

## 2022-01-27 PROCEDURE — 250N000013 HC RX MED GY IP 250 OP 250 PS 637: Performed by: FAMILY MEDICINE

## 2022-01-27 RX ORDER — OXYCODONE HYDROCHLORIDE 5 MG/1
5 TABLET ORAL EVERY 4 HOURS PRN
Status: DISCONTINUED | OUTPATIENT
Start: 2022-01-28 | End: 2022-01-27

## 2022-01-27 RX ORDER — LORAZEPAM 1 MG/1
1 TABLET ORAL 2 TIMES DAILY
Status: DISCONTINUED | OUTPATIENT
Start: 2022-01-27 | End: 2022-01-28 | Stop reason: HOSPADM

## 2022-01-27 RX ORDER — OXYCODONE HYDROCHLORIDE 5 MG/1
5 TABLET ORAL EVERY 4 HOURS PRN
Status: DISCONTINUED | OUTPATIENT
Start: 2022-01-27 | End: 2022-01-27

## 2022-01-27 RX ORDER — NAPROXEN 500 MG/1
500 TABLET ORAL 2 TIMES DAILY WITH MEALS
Status: DISCONTINUED | OUTPATIENT
Start: 2022-01-27 | End: 2022-01-28 | Stop reason: HOSPADM

## 2022-01-27 RX ORDER — OXYCODONE HYDROCHLORIDE 5 MG/1
5-10 TABLET ORAL EVERY 4 HOURS PRN
Status: DISCONTINUED | OUTPATIENT
Start: 2022-01-27 | End: 2022-01-27

## 2022-01-27 RX ORDER — IOPAMIDOL 755 MG/ML
100 INJECTION, SOLUTION INTRAVASCULAR ONCE
Status: COMPLETED | OUTPATIENT
Start: 2022-01-27 | End: 2022-01-27

## 2022-01-27 RX ORDER — OXYCODONE HYDROCHLORIDE 5 MG/1
10 TABLET ORAL EVERY 6 HOURS PRN
Status: COMPLETED | OUTPATIENT
Start: 2022-01-27 | End: 2022-01-28

## 2022-01-27 RX ORDER — OXYCODONE HYDROCHLORIDE 5 MG/1
5 TABLET ORAL EVERY 6 HOURS PRN
Status: DISCONTINUED | OUTPATIENT
Start: 2022-01-28 | End: 2022-01-28 | Stop reason: HOSPADM

## 2022-01-27 RX ORDER — PANTOPRAZOLE SODIUM 40 MG/1
40 TABLET, DELAYED RELEASE ORAL
Status: DISCONTINUED | OUTPATIENT
Start: 2022-01-27 | End: 2022-01-28

## 2022-01-27 RX ORDER — HYDROMORPHONE HYDROCHLORIDE 1 MG/ML
.5-1 INJECTION, SOLUTION INTRAMUSCULAR; INTRAVENOUS; SUBCUTANEOUS DAILY PRN
Status: COMPLETED | OUTPATIENT
Start: 2022-01-27 | End: 2022-01-27

## 2022-01-27 RX ORDER — HYDROMORPHONE HYDROCHLORIDE 1 MG/ML
.5-1 INJECTION, SOLUTION INTRAMUSCULAR; INTRAVENOUS; SUBCUTANEOUS 4 TIMES DAILY PRN
Status: DISCONTINUED | OUTPATIENT
Start: 2022-01-27 | End: 2022-01-27

## 2022-01-27 RX ADMIN — ACETAMINOPHEN 975 MG: 325 TABLET ORAL at 21:08

## 2022-01-27 RX ADMIN — FOLIC ACID 1 MG: 1 TABLET ORAL at 08:30

## 2022-01-27 RX ADMIN — CLONIDINE HYDROCHLORIDE 0.1 MG: 0.1 TABLET ORAL at 08:29

## 2022-01-27 RX ADMIN — LIDOCAINE: 50 OINTMENT TOPICAL at 08:30

## 2022-01-27 RX ADMIN — HYDROMORPHONE HYDROCHLORIDE 1 MG: 1 INJECTION, SOLUTION INTRAMUSCULAR; INTRAVENOUS; SUBCUTANEOUS at 10:56

## 2022-01-27 RX ADMIN — GABAPENTIN 600 MG: 300 CAPSULE ORAL at 08:30

## 2022-01-27 RX ADMIN — HYDROMORPHONE HYDROCHLORIDE 1 MG: 0.2 INJECTION, SOLUTION INTRAMUSCULAR; INTRAVENOUS; SUBCUTANEOUS at 02:26

## 2022-01-27 RX ADMIN — Medication 10 MG: at 04:35

## 2022-01-27 RX ADMIN — CLONIDINE HYDROCHLORIDE 0.1 MG: 0.1 TABLET ORAL at 18:23

## 2022-01-27 RX ADMIN — IOPAMIDOL 100 ML: 755 INJECTION, SOLUTION INTRAVENOUS at 20:24

## 2022-01-27 RX ADMIN — PANTOPRAZOLE SODIUM 40 MG: 40 TABLET, DELAYED RELEASE ORAL at 05:35

## 2022-01-27 RX ADMIN — NAPROXEN 500 MG: 500 TABLET ORAL at 18:23

## 2022-01-27 RX ADMIN — LORAZEPAM 1 MG: 1 TABLET ORAL at 21:09

## 2022-01-27 RX ADMIN — NAPROXEN 500 MG: 500 TABLET ORAL at 10:47

## 2022-01-27 RX ADMIN — TRAZODONE HYDROCHLORIDE 50 MG: 50 TABLET ORAL at 04:03

## 2022-01-27 RX ADMIN — LIDOCAINE: 50 OINTMENT TOPICAL at 13:38

## 2022-01-27 RX ADMIN — Medication 10 MG: at 01:33

## 2022-01-27 RX ADMIN — SODIUM CHLORIDE, POTASSIUM CHLORIDE, SODIUM LACTATE AND CALCIUM CHLORIDE: 600; 310; 30; 20 INJECTION, SOLUTION INTRAVENOUS at 11:49

## 2022-01-27 RX ADMIN — OXYCODONE HYDROCHLORIDE 10 MG: 5 TABLET ORAL at 09:40

## 2022-01-27 RX ADMIN — OXYCODONE HYDROCHLORIDE 5 MG: 5 TABLET ORAL at 19:58

## 2022-01-27 RX ADMIN — ACETAMINOPHEN 975 MG: 325 TABLET ORAL at 08:30

## 2022-01-27 RX ADMIN — GABAPENTIN 600 MG: 300 CAPSULE ORAL at 21:09

## 2022-01-27 RX ADMIN — GABAPENTIN 600 MG: 300 CAPSULE ORAL at 13:36

## 2022-01-27 RX ADMIN — HYDROMORPHONE HYDROCHLORIDE 1 MG: 0.2 INJECTION, SOLUTION INTRAMUSCULAR; INTRAVENOUS; SUBCUTANEOUS at 05:36

## 2022-01-27 RX ADMIN — OXYCODONE HYDROCHLORIDE 5 MG: 5 TABLET ORAL at 15:09

## 2022-01-27 RX ADMIN — CLONIDINE HYDROCHLORIDE 0.1 MG: 0.1 TABLET ORAL at 01:10

## 2022-01-27 RX ADMIN — LORAZEPAM 1 MG: 1 TABLET ORAL at 08:30

## 2022-01-27 RX ADMIN — Medication 1 TABLET: at 08:30

## 2022-01-27 RX ADMIN — PANTOPRAZOLE SODIUM 40 MG: 40 TABLET, DELAYED RELEASE ORAL at 18:23

## 2022-01-27 RX ADMIN — ACETAMINOPHEN 975 MG: 325 TABLET ORAL at 13:37

## 2022-01-27 RX ADMIN — KETOROLAC TROMETHAMINE 15 MG: 30 INJECTION, SOLUTION INTRAMUSCULAR; INTRAVENOUS at 03:45

## 2022-01-27 RX ADMIN — NICOTINE 1 PATCH: 14 PATCH, EXTENDED RELEASE TRANSDERMAL at 10:56

## 2022-01-27 ASSESSMENT — ACTIVITIES OF DAILY LIVING (ADL)
ADLS_ACUITY_SCORE: 5
ADLS_ACUITY_SCORE: 3
ADLS_ACUITY_SCORE: 5
ADLS_ACUITY_SCORE: 3
ADLS_ACUITY_SCORE: 5

## 2022-01-27 ASSESSMENT — MIFFLIN-ST. JEOR: SCORE: 1807.27

## 2022-01-27 NOTE — PLAN OF CARE
Problem: Adult Inpatient Plan of Care  Goal: Optimal Comfort and Wellbeing  Outcome: No Change   Patient continues to rate abdominal pain 9-10. Sleeps after taking pain medication.     Problem: Substance Misuse (Alcohol Withdrawal)  Goal: Readiness for Change Identified  Outcome: No Change  Patient sleeping at beginning of shift. Patient asked for PRN pain medication oxycodone) when patient woke up and insisted he needed dilaudid an hour afterwards. Patient education given about how PRN medications worked. Patient got agitated and set alarm clock to get next PRN med. At bedtime while PRN dilaudid was being administered, patient asked RN to call MD for patient to get next dilaudid at around 0130. Patient education again provided. Patient verbalized understanding.

## 2022-01-27 NOTE — PROGRESS NOTES
"[facility] ACUTE PAIN SERVICE    ([facility], [facility], [facility])  Daily PAIN Progress Note     Assessment/Plan:     Javier Rivas is a 26 year old male who was admitted on 1/21/2022. Pain Service is asked to see the patient for pain management. Admitted for evaluation of abdominal pain found to have pancreatitis. History of alcohol dependence, duodenitis, anxiety, bipolar disorder. Patient with alcohol use disorder previous withdrawal symptoms no seizures.  [facility] Consult: \"The patient doesn't smoke tobacco does vape, and regular marijuana use. He denies chronic abdominal pain.  Imaging:  Edema of the pancreatic head wall thickening of adjacent duodenum not excluded, fatty liver. Recommend: smoking and alcohol cessation, IVF, Pain Control, now clear liquid diet\"    Pt seen by chem dep: which reported his disintrerest naltrexone or acamprosate, not interested in treatment since he reports he relapsed for only 3 days. Patient reports that heavy use began at the age of [age].  He went to treatment 7 months ago in [place] and had a withdrawal seizure there.  He has been sober since then until 3 days prior to admission when he began drinking vodka 750 mL daily.\"     Patient reporting 10/10 pain however tolerating clear liquids with no nausea, vomitting. Patient reports he'd rather continue getting IV dilaudid and not eat here and just eat when he goes home. Explained that his pain needs to be management so we can stop the IV, he can tolerate food, and then discuss discharge plan with treatment team. Pt shows understanding, will change all meds to oral, discontinue IV dilaudid and leave a one time dose if needed today.          PLAN:   1) Pain is consistent with acute abdominal pain associated with pancreatitis.  Lipase is WNL today = 16.   2)Multimodal Medication Therapy  Topical: none for now  NSAID: Toradol 15 mg every 6 hours - change to naproxen 500 mg po BID with food  Muscle " Relaxants: none  Adjuvants: gabapentin 300 mg tid - increased to gabapentin 600 mg po BID by primary, APAP 975 mg po TID  Antidepressants/anxiolytics: bzds per CIWA completed  Opioids: concentrated oxycodone solution 5-10 mg po q3h prn SL first line - change to tablets  IV Pain medication:  Hydromorphone 0.5-1 mg qid prn - discontinue, have one time dose available if needed today.   3)Non-medication interventions  Pharmacy consult- appreciate recommendations   Acupuncture consult- as available Mon and Thursday    Integrative consult - called referral to [phone]   4)Constipation Prophylaxis  Daily stool softener/laxative as needed  5) Follow up   -Opioid prescriber has been none  -Discharge Recommendations - We recommend prescribing the following at the time of discharge:Pt high risk for ongoing substance abuse and refusal of any alchohol substancance treatment or other treatment modalities for etoh. Opioids not recommended for ongoing therapy If ordered at discharge, and pain improved recommend oxycodone 5 mg po q6h prn #10 tabs, scheduled APAP x 1 week, and scheduled naproxen x 1 week with food.      Bhavya Eldridge, PharmD, BCPS, CPE  Acute Care Pain Management Program  [facility] ([facility]   Page via [facility]- Click HERE to page Sweta or call [phone]

## 2022-01-27 NOTE — PROGRESS NOTES
Patient requesting pain medications during the shift.  Patient is asking when I dilaudid can be given when writer is administering oral pain medications.  Patient rates pain 10/10 most of the shift.  Patient noted to be falling asleep periodically while requesting pain medications.

## 2022-01-27 NOTE — PROGRESS NOTES
"Addiction Medicine Follow Up    Tele-Visit Details    Type of service:  Video Visit    Time Service Began (time 1st connected with pt): 1335    Time Service Ended (time completely finished with pt): 1414    Originating Location (pt. Location): Patient Rm, [facility]    Distant Location (provider location): [facility]    Reason for Televisit: COVID 19    Mode of Communication:  Video Conference via [employer]    Physician has received verbal consent for a video visit from the patient? Yes        Active Problems:    Acute pancreatitis    Nausea    Duodenitis    Alcohol use disorder, severe, dependence (H)    LUQ abdominal pain    Alcohol withdrawal syndrome (H)    Opioid use disorder, severe, dependence (H)      Subjective  Chief complaint: abdomenal pain    HPI: Patient is a 25 yo male with alcohol use disorder, severe who was admitted on 1/21/22 with abdominal pain, thought secondary to acute pancreatitis.   Initial alcohol level was 363.  Patient reports he had been sober for 1 year and 3 mos and then drank 1 L hard liquor /d for 5 days with last use on the day of admission.  He had mild-moderate alcohol withdrawal, mainly with self-reported anxiety, and got as much as 100 mg diazepam in 24 hrs on 1/24/21.  He was demanding alprazolam rather than diazepam a few days ago. He still c/o sweating, anxiety and tremors, although no tremors or sweating seen on video, and patient appears calm.            He has also been getting IV hydromorphonne prn for his LUQ abdomenal pain, and continues to demand it, despite improved clinical signs of pancreatitis.   He reports his abdomenal pain is \"excruciating\" but does not appear to be in pain.       ROS:   Resp:  No SOB   CV:  No chest pain   GI:  No nausea or vomiting;  Eating full diet   Ext:  No swelling   Psych:  Denies depression or anxiety at baseline.     No current outpatient medications on file.       Objective    /60 (BP " "Location: Right arm)   Pulse 72   Temp 98.1  F (36.7  C) (Oral)   Resp 18   Ht 1.829 m (6')   Wt 78.4 kg (172 lb 14.4 oz)   SpO2 98%   BMI 23.45 kg/m      Per hospitalist, patient reported severe pain with very light touch to abdomen.        No distress.   Sitting on bed, no grimacing or wincing with movements.     Skin:  No diaphoresis  Neuro:  Alert, oriented x 4   Psych:    Mood:  \"severely anxious\"                Affect:  Not congruent - appears comfortable and pleasant until challenged about the opiates               Thought content:  Denies SI, HI or hallucinations               Thought processes:  Linear, focused on medications                Speech:  Normal                Motor:  Normal; no increased restlessness                Insight/judgement:  poor/  poor    Results  Lipase -    70  - 360 - 16  LFTs normal    Assessment and Plan:  1.  Alcohol Use Disorder, severe -  6 days since last drink.   No sweating or tremors seen on video visit.    Lorazepam 1 mg tid yesterday.   Will give 1 mg bid today and then last dose in am and discontinue.  Would continue Gabapentin 600 mg tid for anxiety and mild withdrawal.    Reports that oral naltrexone bothered his stomach, but was able to tolerate an 3 mos. Naltrexone shot ( not sure this exists).   Discussed options and he would like to consider ER Naltrexone.   Would be willing to come to the [facility] for this.    Will give appt.  Explained that he would need to take at least a few days of oral naltrexone again, just to be sure it will not have to severe side effects.                                    NO BENZODIAZEPINES AT DISCHARGE.          Patient wants to pursue a isi-based program at his [employer] for his alcohol use disorder.         2.  Alcohol-related pancreatitis - c/o of continued severe pain, but not congruent with observed status.  All clinical indicators of acute disease are improved.   On low-fat diet.   Discussed with " hospitalist and Acute Pain Team.   All agree that his subjective reports of pain and requests for IV dilaudid are out of proportion to the observed and clinical situation.   Limits set with patient that he will not get any  More IV pain medications.   Plan:  Two doses of oxycodone 10 mg po, then a few 5 mg doses and then all opiates will be discontinued.                   Naproxen po     Gabapentin for anxiety and pain     NO OPIOIDS AT DISCHARGE.                                    Will increase PPI and try sucralfate to see if some of this might be from upper GI.    3.  Drug seeking behavior - Discussed issue of cross-addiction and risks of using any addictive medications.      Janette Bryant MD  Addiction Medicine Service  [facility]   Page me (click here for Pepper Bryant)

## 2022-01-27 NOTE — PROGRESS NOTES
"Patient requested IV dilaudid.  Patient given oral oxycodone @ 0940 and requested IV dilaudid @ 1040.  Patient given oral naproxen and encouraged to wait for IV dilaudid for breakthrough pain that is uncontrolled.  Patient called again @ 1045 for IV pain medications and reports that his pain is 10/10 and is unable to \"even hold my phone\".  Patient currently on phone and lap top texting.  Writer explained that he has only a one time order for IV pain medications.  Patient reports that \"its my right to get my pain medications when I ask for them.\"  Writer again explained it is only a one time order but, he does have the right to utilize it when he needs it. Writer gave patient 1mg of IV dilaudid at 1056 for pain rated 10/10.  After IV medications where administered patient asked, \"how soon can I have my oxycodone.  I don't know why the doctor will only give me one more dose the other doctors have given it to me right up till I leave.\"  Patient again was reminded of the pain team plan. Patient states, \"I am discharging @ 0300.\"  Writer explained that discharges don't usually occur @ 0300.  Patient states, \"you can have my discharge papers ready and I can just walk out of here then.\"   "

## 2022-01-27 NOTE — PROGRESS NOTES
"Patient reports having hallucinations to the nursing assistant this morning.  Patient reports that his pain is 10/10 and that he needs IV dilaudid immediately for the hallucinations.  Hospitalist was updated on patient report of hallucinations.  When writer got to room to talk with patient regarding his pain plan and his hallucinations.  Patient reports, \"I just said that because I may have been sleeping.  I wasn't really sleeping though I was awake but sleeping.\"  Patient was given scheduled medications.  Patient then requested to have his oral medications and while stating he needed oral medications to follow his pain teams order he than state \"and an hour after that I can have the IV dilaudid I can have that four times a day.\"  Patient states \"I am discharging either today or by 0500 tomorrow because, my girlfriend is flying in on the red eye from [place].\" Patient sitting in bed while on the phone.    "

## 2022-01-27 NOTE — PROGRESS NOTES
Daily Progress Note        CODE STATUS:  Full Code    01/26/22  Assessment/Plan:  Javier Rivas is a 26 year old old male with h/o polysubstance abuse (alcohol, cannabis), who bipolar and panic disorder, pancreatitis who presented to the hospital with the left upper quadrant pain after drinking alcohol found to have acute pancreatitis and admitted on 1/21/2022 for further management.     Acute recurrent pancreatitis; likely due to alcohol use disorder  --On 1/21, CT A/P reported edema pancreatic head and wall thickening of adjacent duodenum not excluded.  Correlate clinically for acute pancreatitis versus duodenitis  --Per reports, history of pancreatitis in the past.  Alcohol use disorder  --Mildly elevated lipase, now normal.  --Though patient complained of 10/10 but denies nausea, vomiting and asking for food, does not look in distress, not tachycardic, not tachypneic, not febrile, seems sedated.  Patient has been on Tylenol and Toradol and as needed narcotic medications.  However, asking for Dilaudid frequently.  Discussed with pain team in detail, will not recommend any IV narcotics  --Discussed with gastroenterologist, okay to advance to diet to low-fat diet, order placed    Alcohol intoxication with withdrawal;  --Addiction medicine provider consulted, appreciated recommendations   --Patient not interested in naltrexone or acamprosate   --Does not seems withdrawing.  On Neurontin and Ativan per addiction medicine provider.  --Continue folic acid, thiamine, multivitamin  -- consulted for chemical dependency and reported they provided resources to the patient.  --Patient advised to quit drinking    History of bipolar disorder/panic disorder;  --Personally discussed with psychiatry, patient declined any antipsychotic medication at this time.  Appreciated psychiatric input, cleared patient for discharge.  --Continue clinical monitoring    Tobacco use disorder;  --On nicotine patch.  Advised to quit  "smoking    Renal calculus  Incidental finding on CT abdomen nonobstructive right renal calculus,  Follow-up as an outpatient      DVT prophylaxis; SCDs, ambulate     Disposition; anticipate home  Barrier to discharge; pancreatitis     LOS: 4 days     Subjective:  Interval History: Patient seen and examined. Notes, labs, imaging reports personally reviewed.  Patient complaining of ongoing pain issues and asking for only narcotic pain medications, especially Dilaudid.  However, per multiple providers assessment, nursing assessment, patient does not look in distress.he complains of  10/10 pain.  When I entered the room, patient sitting on the bed and on laptop. Before I asked any questions about how he is doing, patient stated \"I am dying with pain, requested nursing staff to bring pain medication and it has already been more than half an hours\".  Patient looked very comfortable, rather seems sedated, not tachypneic, not tachycardic, not diaphoretic, no any other signs and symptoms of severe pain.  Discussed with gastroenterologist.    Discussed with pain team and expressed my concern for possible overmedicated with pain medication.    Discussed with psychiatry  Page out to addiction medicine provider and awaiting callback.  Addendum;  I had detailed conversation with addiction medicine provider,Dr Bryant refer to her note for details.    Review of Systems:   As mentioned in subjective.    Patient Active Problem List   Diagnosis     Major depression     Acute pancreatitis     Nausea     Pancreatitis     Duodenitis     Alcohol use disorder, severe, dependence (H)     LUQ abdominal pain     Alcohol withdrawal syndrome (H)     Opioid use disorder, severe, dependence (H)       Scheduled Meds:    acetaminophen  975 mg Oral TID     cloNIDine  0.1 mg Oral Q8H     folic acid  1 mg Oral Daily     gabapentin  600 mg Oral TID     lidocaine   Topical 4x Daily     multivitamin w/minerals  1 tablet Oral Daily     naproxen  500 mg " Oral BID w/meals     nicotine  1 patch Transdermal Daily     nicotine   Transdermal Q8H     pantoprazole  40 mg Oral QAM AC     Continuous Infusions:    lactated ringers 25 mL/hr at 01/27/22 1149     PRN Meds:.acetaminophen **OR** acetaminophen, flumazenil, OLANZapine zydis **OR** haloperidol lactate, hydrALAZINE, lidocaine viscous 2% 15 mL and maalox/mylanta w/ simethicone 15 mL (GI COCKTAIL), melatonin, naloxone **OR** naloxone **OR** naloxone **OR** naloxone, ondansetron **OR** ondansetron, oxyCODONE, traZODone    Objective:  Vital signs in last 24 hours:  Temp:  [97.4  F (36.3  C)-98.1  F (36.7  C)] 98.1  F (36.7  C)  Pulse:  [66-74] 72  Resp:  [18] 18  BP: (110-118)/(60-79) 110/60  SpO2:  [94 %-98 %] 98 %      Intake/Output Summary (Last 24 hours) at 1/26/2022 1142  Last data filed at 1/26/2022 0655  Gross per 24 hour   Intake 2720 ml   Output --   Net 2720 ml       Physical Exam:  General: Not in obvious distress.  HEENT: Normocephalic, supple neck  Chest: Clear to auscultation bilateral anteriorly, no wheezing  Heart: S1S2 normal, regular  Abdomen: Soft.  I was barely placed my hand on his belly and patient was complaining of severe pain. Bowel sounds- active.  Extremities: No legs swelling  Neuro: Appears sedated, follows simple commands appropriately, moves all extremities      Lab Results:(I have personally reviewed the results)    No results found for this or any previous visit (from the past 24 hour(s)).      Serum Glucose range:   Recent Labs   Lab 01/26/22  0659 01/23/22  1018 01/21/22  2141   GLC 90 69* 94     ABG: No lab results found in last 7 days.  CBC:   Recent Labs   Lab 01/26/22  0659 01/23/22  1018 01/21/22  2141   WBC 3.7* 5.0 6.8   HGB 13.5 13.2* 15.9   HCT 41.2 39.9* 47.3   MCV 92 91 90    171 289   NEUTROPHIL  --  75 52   LYMPH  --  10 37   MONOCYTE  --  14 9   EOSINOPHIL  --  1 1     Chemistry:   Recent Labs   Lab 01/26/22  0659 01/25/22  0706 01/24/22  0753 01/23/22  1732  01/23/22  1018 01/21/22  2141     --   --   --  135* 144   POTASSIUM 4.1 3.7 3.5   < > 3.4* 3.9   CHLORIDE 99  --   --   --  99 104   CO2 30  --   --   --  24 25   BUN 2*  --   --   --  3* 11   CR 0.76  --   --   --  0.69* 0.80   GFRESTIMATED >90  --   --   --  >90 >90   SOUTH 9.1  --   --   --  8.6 9.2   MAG  --   --   --   --   --  1.9   PROTTOTAL 6.6  --   --   --  6.3 8.2*   ALBUMIN 3.5  --   --   --  3.6 4.6   AST 34  --   --   --  32 39   ALT 26  --   --   --  25 39   ALKPHOS 40*  --   --   --  46 58   BILITOTAL 0.3  --   --   --  0.9 0.4    < > = values in this interval not displayed.     Coags:  No results for input(s): INR, PROTIME, PTT in the last 168 hours.    Invalid input(s): APTT  Cardiac Markers:  Recent Labs   Lab 01/21/22 2141   TROPONINI <0.01        CT Abdomen Pelvis w Contrast    Result Date: 1/21/2022  EXAM: CT ABDOMEN PELVIS W CONTRAST LOCATION: [facility] DATE/TIME: 1/21/2022 10:35 PM INDICATION: Left lower quadrant pain. COMPARISON: 12/30/2021 TECHNIQUE: CT scan of the abdomen and pelvis was performed following injection of IV contrast. Multiplanar reformats were obtained. Dose reduction techniques were used. CONTRAST: isovue 370 100ml FINDINGS: LOWER CHEST: Lung bases clear. HEPATOBILIARY: Hepatic steatosis. PANCREAS: Edema of the pancreatic head wall thickening of adjacent duodenum not excluded. SPLEEN: Normal. ADRENAL GLANDS: Normal. KIDNEYS/BLADDER: Subcentimeter renal hypodensities small for characterization. Nonobstructing right renal calculus. BOWEL: Underdistention of the colon reduces evaluation for wall thickening/colitis. The appendix is normal. Bowel is normal caliber. LYMPH NODES: Normal. VASCULATURE: Unremarkable. PELVIC ORGANS: Prostate calcification. MUSCULOSKELETAL: Normal.     IMPRESSION: 1.  Edema of the pancreatic head and wall thickening of the adjacent duodenum not excluded. Correlate clinically for pancreatitis versus duodenitis. 2.  Colon  is underdistended reducing evaluation for wall thickening/early changes of colitis. 3.  Nonobstructing right renal calculus. 4.  Fatty liver. 5.  Normal appendix.      Latest radiology report personally reviewed.    Note created using dragon voice recognition software so sounds alike errors may have escaped editing.    01/27/2022   ZEKE ANTHONY MD  HOSPITALIST, [facility]  PAGER NO. [phone]

## 2022-01-27 NOTE — CONSULTS
"  ACUPUNCTURIST TREATMENT NOTE    Name: Javier Rivas  :  1995  MRN:  [id number]    Acupuncture Treatment  Patient Type: Medical  Intervention Reason: Pain,Anxiety/Stress  Pain Location: abdomen, mid back  Pre-session Pain Ratin  Post-session Pain Ratin  Pre-session Stress/Anxiety ratin  Post-session Stress/Anxiety ratin  Patient complaint:: Patient complains of pain in abdomen radiating into mid/upper back, high anxiety  Initial insertions: Baihui, [place], Du24, Yintang, Li11, Li4, St34, St36, Gb34, Sp6, Liv3, St44. (R) NADA   Number of needles inserted: 28  Number of needles removed: 28  Treatment Observations: Patient fell asleep during treatment and stated he relaxed well.         \"Risks and benefits of acupuncture were discussed with patient. Consent for treatment was given. We thank you for the referral.\"     Audelia Smith L.Ac.    Date:  2022  Time:  1:25 PM    "

## 2022-01-27 NOTE — PLAN OF CARE
Problem: Adult Inpatient Plan of Care  Goal: Plan of Care Review  Outcome: No Change    Patient aware he can only have IV dilaudid PRN QID.  Requested dilaudid at 0100.  Discussed that we need to given oxycodone prior to dilaudid and only give IV dilaudid with oxycodone was not effective.  PRN oxycodone was given at 0120, a hour later patient requesting IV dilaudid, given at 0220.  Continues to rate pain 9/10.    At 0330 patient requesting an emergency dose of oxycodone.  Patient does not appear to be in severe/moderate pain.  I gave Toradol early.  Patient continues to want oxycodone early.  Discussed need to follow plan provided by the pain team.  Told patient the earliest I can given oxycodone would be 0430.  Patient making frequent request of additional pain medication.  Again I repeated that the pain team has a plan in place and we are not going to deviate from that plan.  Patient wanted to talk to another nurse to get additional pain medication.  I had to repeat again that we are not going to deviate from his pain plan.  Patient states other nurses have been calling the doctors to get additional pain medication but I could not find any evidence of that.  Patient also states he is unable to sleep, trazodone was given.  Though patient did drink two 20 oz of Mountain Dew.  Patient eventually agreed to wait until 0430 for his PRN oxycodone.    Aditya Gan RN

## 2022-01-28 PROCEDURE — 250N000013 HC RX MED GY IP 250 OP 250 PS 637: Performed by: INTERNAL MEDICINE

## 2022-01-28 PROCEDURE — 250N000013 HC RX MED GY IP 250 OP 250 PS 637: Performed by: PHYSICIAN ASSISTANT

## 2022-01-28 PROCEDURE — 99239 HOSP IP/OBS DSCHRG MGMT >30: CPT | Performed by: INTERNAL MEDICINE

## 2022-01-28 RX ORDER — GABAPENTIN 300 MG/1
300 CAPSULE ORAL 3 TIMES DAILY
Status: DISCONTINUED | OUTPATIENT
Start: 2022-01-28 | End: 2022-01-28 | Stop reason: HOSPADM

## 2022-01-28 RX ORDER — AMOXICILLIN 250 MG
1 CAPSULE ORAL 2 TIMES DAILY
Status: DISCONTINUED | OUTPATIENT
Start: 2022-01-28 | End: 2022-01-28 | Stop reason: HOSPADM

## 2022-01-28 RX ORDER — ACETAMINOPHEN 500 MG
500 TABLET ORAL EVERY 6 HOURS PRN
COMMUNITY
Start: 2022-01-28

## 2022-01-28 RX ORDER — PANTOPRAZOLE SODIUM 40 MG/1
40 TABLET, DELAYED RELEASE ORAL
Status: DISCONTINUED | OUTPATIENT
Start: 2022-01-28 | End: 2022-01-28 | Stop reason: HOSPADM

## 2022-01-28 RX ORDER — GABAPENTIN 300 MG/1
300 CAPSULE ORAL 3 TIMES DAILY
Qty: 15 CAPSULE | Refills: 0 | Status: SHIPPED | OUTPATIENT
Start: 2022-01-28

## 2022-01-28 RX ORDER — NAPROXEN 500 MG/1
500 TABLET ORAL 2 TIMES DAILY PRN
Qty: 10 TABLET | Refills: 0 | Status: SHIPPED | OUTPATIENT
Start: 2022-01-28

## 2022-01-28 RX ORDER — MULTIPLE VITAMINS W/ MINERALS TAB 9MG-400MCG
1 TAB ORAL DAILY
COMMUNITY
Start: 2022-01-28

## 2022-01-28 RX ORDER — LANOLIN ALCOHOL/MO/W.PET/CERES
100 CREAM (GRAM) TOPICAL DAILY
Qty: 30 TABLET | Refills: 0 | Status: SHIPPED | OUTPATIENT
Start: 2022-01-28

## 2022-01-28 RX ADMIN — CLONIDINE HYDROCHLORIDE 0.1 MG: 0.1 TABLET ORAL at 01:37

## 2022-01-28 RX ADMIN — OXYCODONE HYDROCHLORIDE 5 MG: 5 TABLET ORAL at 07:28

## 2022-01-28 RX ADMIN — PANTOPRAZOLE SODIUM 40 MG: 40 TABLET, DELAYED RELEASE ORAL at 07:28

## 2022-01-28 RX ADMIN — OXYCODONE HYDROCHLORIDE 10 MG: 5 TABLET ORAL at 01:37

## 2022-01-28 ASSESSMENT — ACTIVITIES OF DAILY LIVING (ADL)
ADLS_ACUITY_SCORE: 5
ADLS_ACUITY_SCORE: 3
ADLS_ACUITY_SCORE: 5
ADLS_ACUITY_SCORE: 5
ADLS_ACUITY_SCORE: 3
ADLS_ACUITY_SCORE: 5
ADLS_ACUITY_SCORE: 3
ADLS_ACUITY_SCORE: 5

## 2022-01-28 NOTE — PROGRESS NOTES
[facility] Progress Note    Subjective:  Pt reports tolerating low fat diet including mashed potatoes, however continues to have significant pain. Denies nausea, emesis, or fever. Frustrated with pain control. Urinating well. No cp, SOB.    Objective:  Vital signs in last 24 hours  Temp:  [97.8  F (36.6  C)-98.2  F (36.8  C)] 98.2  F (36.8  C)  Pulse:  [66-89] 89  Resp:  [18] 18  BP: (110-121)/(60-77) 121/77  SpO2:  [94 %-98 %] 97 % O2 Device: None (Room air)      Gen: A&Ox3, NAD, lethargic, walking in room, non-toxic  Eyes: No icterus  Gastrointestinal: Soft, Non-distended, TTP in epigastrium and LUQ, no rebound or guarding      LABORATORY    ELECTROLYTE PANEL   Recent Labs   Lab 01/26/22  0659 01/25/22  0706 01/24/22  0753 01/23/22  1739 01/23/22  1018 01/21/22  2141     --   --   --  135* 144   POTASSIUM 4.1 3.7 3.5   < > 3.4* 3.9   CHLORIDE 99  --   --   --  99 104   CO2 30  --   --   --  24 25   GLC 90  --   --   --  69* 94   CR 0.76  --   --   --  0.69* 0.80   BUN 2*  --   --   --  3* 11    < > = values in this interval not displayed.      HEMATOLOGY PANEL   Recent Labs   Lab 01/26/22  0659 01/23/22  1018 01/21/22  2141   HGB 13.5 13.2* 15.9   MCV 92 91 90   WBC 3.7* 5.0 6.8    171 289      LIVER AND PANCREAS PANEL   Recent Labs   Lab 01/26/22  0659 01/23/22  1018 01/21/22  2141   AST 34 32 39   ALT 26 25 39   ALKPHOS 40* 46 58   BILITOTAL 0.3 0.9 0.4   LIPASE 16 360* 70*     IMAGING STUDIES    No results found.    I have reviewed the current diagnostic and laboratory tests.                Assessment:  1. Recurrent EtOH pancreatitis - chronic ETOH and tobacco abuse; intoxicated on admission. Stable. Tolerating diet. Biochemical improvement, however ongoing pain. Concern for overmedication/sedation.  2. EtOH abuse             Patient Active Problem List   Diagnosis     Major depression     Acute pancreatitis     Nausea     Pancreatitis     Duodenitis     Alcohol dependence with  uncomplicated intoxication (H)     LUQ abdominal pain         Plan:  -CT abdomen for further evaluation of persistent pain.  -Oral analgesia PRN  -Continue pantoprazole 40 mg daily as inpt.  -Chem dep evaluation.  -If CT unremarkable, advance diet to low fat diet for 2 weeks.    Approximately 15 minutes of total time was spent providing patient care including patient evaluation, reviewing documentation/test results, and .                                                  Saul Davis MD  Thank you for the opportunity to participate in the care of this patient.   Please feel free to call me with any questions or concerns.  Phone number [phone].

## 2022-01-28 NOTE — DISCHARGE SUMMARY
[facility] MEDICINE  DISCHARGE SUMMARY     Primary Care Physician: Elizabeth Adams  Admission Date: 1/21/2022   Discharge Provider: Moose CABALLERO MD Discharge Date: 1/28/2022   Diet:   Active Diet and Nourishment Order   Procedures     Combination Diet Low Fat Diet     Diet       Code Status: Full Code   Activity: DCACTIVITY: Activity as tolerated        Condition at Discharge: Good     REASON FOR PRESENTATION(See Admission Note for Details)   Abdominal pain.  Please refer to H&P for details    PRINCIPAL & ACTIVE DISCHARGE DIAGNOSES     Active Problems:    Acute pancreatitis    Nausea    Duodenitis    Alcohol use disorder, severe, dependence (H)    LUQ abdominal pain    Alcohol withdrawal syndrome (H)    Opioid use disorder, severe, dependence (H)      PENDING LABS     Unresulted Labs Ordered in the Past 30 Days of this Admission     No orders found from 12/22/2021 to 1/22/2022.            PROCEDURES ( this hospitalization only)          RECOMMENDATIONS TO OUTPATIENT PROVIDER FOR F/U VISIT     Follow-up Appointments     Follow-up and recommended labs and tests       Follow up with primary care provider, ELIZABETH ADAMS, within 7 days   for hospital follow- up.             Follow-up with addiction medicine provider as recommended    DISPOSITION     Home    SUMMARY OF HOSPITAL COURSE:      Javier Rivas is a 26 year old old male with h/o polysubstance abuse (alcohol, cannabis), who bipolar and panic disorder, pancreatitis who presented to the hospital with the left upper quadrant pain after drinking alcohol found to have acute pancreatitis and admitted on 1/21/2022 for further management.     Acute recurrent pancreatitis; likely due to alcohol use disorder  --On 1/21, CT A/P reported edema pancreatic head and wall thickening of adjacent duodenum not excluded.  Correlate clinically for acute pancreatitis versus duodenitis  --Per reports, history of pancreatitis in the past.   Alcohol use disorder  --Mildly elevated lipase, now normal.  --Patient continued to complain 10/10 abdominal pain and asking only narcotic pain medication especially Dilaudid.  However, without any physical signs and symptoms of pain.  Repeat CT on 1/27 reported resolved edema.  --Appreciated GI input, okay for low-fat diet and cleared for discharge.  Pain management with Tylenol and as needed naproxen.   --Continue home PPI      Alcohol intoxication with withdrawal; symptoms improved  --Addiction medicine provider consulted, appreciated recommendations   --Patient not interested in naltrexone or acamprosate   --Had detailed conversation with addiction medicine provider, okay to taper Neurontin.  Also I am doubtful that patient is going to take medication after discharge  --Follow-up with addiction medicine provider as recommended.    -- consulted for chemical dependency and reported they provided resources to the patient.  --Patient advised to quit drinking     History of bipolar disorder/panic disorder;  --Personally discussed with psychiatry, patient declined any antipsychotic medication at this time.  Appreciated psychiatric input, cleared patient for discharge.     Tobacco use disorder;  --Advised to quit smoking     Renal calculus  Incidental finding on CT abdomen nonobstructive right renal calculus,  Follow-up as an outpatient         LOS: 4 days     Discharge Medications with Med changes:     Current Discharge Medication List      START taking these medications    Details   acetaminophen (TYLENOL) 500 MG tablet Take 1 tablet (500 mg) by mouth every 6 hours as needed (pain)    Associated Diagnoses: Alcohol-induced acute pancreatitis without infection or necrosis      gabapentin (NEURONTIN) 300 MG capsule Take 1 capsule (300 mg) by mouth 3 times daily  Qty: 15 capsule, Refills: 0    Associated Diagnoses: Alcohol withdrawal syndrome without complication (H)      multivitamin w/minerals  (THERA-VIT-M) tablet Take 1 tablet by mouth daily    Associated Diagnoses: Alcohol withdrawal syndrome without complication (H)      naproxen (NAPROSYN) 500 MG tablet Take 1 tablet (500 mg) by mouth 2 times daily as needed for moderate pain Take with food when taking  Qty: 10 tablet, Refills: 0    Associated Diagnoses: Alcohol-induced acute pancreatitis without infection or necrosis      thiamine (B-1) 100 MG tablet Take 1 tablet (100 mg) by mouth daily  Qty: 30 tablet, Refills: 0    Associated Diagnoses: Alcohol withdrawal syndrome without complication (H)         CONTINUE these medications which have NOT CHANGED    Details   cloNIDine (CATAPRES) 0.1 MG tablet Take 0.1 mg by mouth 2 times daily as needed      fexofenadine-pseudoePHEDrine (ALLEGRA-D 24) 180-240 MG per tablet Take 1 tablet by mouth daily as needed       hydrOXYzine (ATARAX) 25 MG tablet Take 25 mg by mouth 4 times daily as needed      ondansetron (ZOFRAN-ODT) 4 MG ODT tab Place 4 mg under the tongue every 8 hours as needed      pantoprazole (PROTONIX) 40 MG EC tablet Take 40 mg by mouth daily      traZODone (DESYREL) 50 MG tablet Take 50 mg by mouth nightly as needed      zolpidem (AMBIEN) 10 MG tablet Take 10 mg by mouth nightly as needed                   Rationale for medication changes:      Please refer to hospital course        Consults       GASTROENTEROLOGY IP CONSULT  SOCIAL WORK IP CONSULT  PAIN MANAGEMENT ADULT IP CONSULT  SOCIAL WORK IP CONSULT  PAIN MANAGEMENT ADULT IP CONSULT  ACUPUNCTURE IP CONSULT  ADDICTION MEDICINE INPATIENT CONSULT  PSYCHIATRY IP CONSULT  PSYCHIATRY IP CONSULT  ADDICTION MEDICINE INPATIENT CONSULT    Immunizations given this encounter     Most Recent Immunizations   Administered Date(s) Administered     COVID-19,PF,Jazmin 09/03/2021           Anticoagulation Information          SIGNIFICANT IMAGING FINDINGS     Results for orders placed or performed during the hospital encounter of 01/21/22   CT Abdomen Pelvis w  Contrast    Impression    IMPRESSION:   1.  Edema of the pancreatic head and wall thickening of the adjacent duodenum not excluded. Correlate clinically for pancreatitis versus duodenitis.  2.  Colon is underdistended reducing evaluation for wall thickening/early changes of colitis.  3.  Nonobstructing right renal calculus.  4.  Fatty liver.  5.  Normal appendix.   CT Abdomen Pelvis w Contrast    Impression    IMPRESSION:   1.  Previous edema and inflammatory change along the pancreatic head and second portion of the duodenum seen on 1/21/2022 have resolved. Mild fluid distention of the stomach, however nothing for small bowel inflammatory change.  2.  Pancreatic parenchymal atrophy diffusely. Nothing for pancreatitis.  3.  Normal appearance to the liver, previous fatty infiltration resolved.  4.  Nonobstructing right kidney stone stable.       SIGNIFICANT LABORATORY FINDINGS     Most Recent 3 CBC's:Recent Labs   Lab Test 01/26/22  0659 01/23/22  1018 01/21/22  2141   WBC 3.7* 5.0 6.8   HGB 13.5 13.2* 15.9   MCV 92 91 90    171 289     Most Recent 3 BMP's:Recent Labs   Lab Test 01/26/22  0659 01/25/22  0706 01/24/22  0753 01/23/22  1739 01/23/22  1018 01/21/22  2141     --   --   --  135* 144   POTASSIUM 4.1 3.7 3.5   < > 3.4* 3.9   CHLORIDE 99  --   --   --  99 104   CO2 30  --   --   --  24 25   BUN 2*  --   --   --  3* 11   CR 0.76  --   --   --  0.69* 0.80   ANIONGAP 7  --   --   --  12 15   SOUTH 9.1  --   --   --  8.6 9.2   GLC 90  --   --   --  69* 94    < > = values in this interval not displayed.     Most Recent 2 LFT's:Recent Labs   Lab Test 01/26/22  0659 01/23/22  1018   AST 34 32   ALT 26 25   ALKPHOS 40* 46   BILITOTAL 0.3 0.9           Discharge Orders        Acupuncture Referral      Reason for your hospital stay    Admitted for abdominal pain, alcohol intoxication     Follow-up and recommended labs and tests     Follow up with primary care provider, ELIZABETH RODRIGUEZ, within 7 days for  hospital follow- up.     Activity    Your activity upon discharge: activity as tolerated     Diet    Follow this diet upon discharge: Orders Placed This Encounter      Combination Diet Low Fat Diet       Examination   Physical Exam   Temp:  [97.3  F (36.3  C)-98.2  F (36.8  C)] 97.3  F (36.3  C)  Pulse:  [79-89] 79  Resp:  [18] 18  BP: (121-127)/(77-80) 127/80  SpO2:  [96 %-97 %] 96 %  Wt Readings from Last 1 Encounters:   01/27/22 78.4 kg (172 lb 14.4 oz)     Patient seen and examined.  Notes, labs, imaging report personally reviewed.  Night nurse paged provider stating patient wants to leave by 7:45 AM.  Patient sitting at the edge of the bed during my visit, reported he has to catch a flight and wants to leave.  He was asking narcotic pain medications.  Given CT reported resolution of edema and inflammation and no physical signs and symptoms of pain, reported we will not be prescribing any narcotic pain medication.  Discussed with nursing staff.    General: Not in obvious distress.  HEENT: Normocephalic, supple neck  Chest: Clear to auscultation bilateral anteriorly, no wheezing  Heart: S1S2 normal, regular  Abdomen: Soft. NT, ND. Bowel sounds- active.  Extremities: No legs swelling  Neuro: alert and awake, grossly non-focal    Please see EMR for more detailed significant labs, imaging, consultant notes etc.    Moose STEINBERG MD, personally saw the patient today and spent greater than 30 minutes discharging this patient.    Moose CABALLERO MD  [facility]    CC:Josué Adams

## 2022-01-28 NOTE — SIGNIFICANT EVENT
"Pt packed and ready to discharge. MD aware and on unit discussing discharge with pt.   Witness in room discharge discussion with MD- pt refused to wait for paperwork or any medication. He did ask for \"pain meds just in case\". MD declined request for narcotics, but did prescribe gabapentin and tylenol and naproxen. Pt asked they be sent to his pharmacy and declined any discharge paperwork. MD and RN witnessed him declining paperwork and leaving unit.  "

## 2022-01-28 NOTE — PLAN OF CARE
Writer took over care at 1900. Upon writers arrival, pt wanted to leave AMA due to not receiving pain medications and refusing to go down for CT scan. Resident was at the bedside as well as security. Charge nurse, Resident, and writer at bedside explained to pt the pain regimen and what it would mean if he left AMA. A plan for pt to receiving PRN oxycodone at that time was made and pt was agreeable. Pt stayed and went for his scan. Did complain of left upper quadrant pain. PRN Oxycodone available as well as Lidocaine ointment. G/Psych following. Pt is alert and orientated. Irritable most of the shift. Up independently with transfers and ambulation. Will continue to monitor.

## 2022-01-28 NOTE — PROGRESS NOTES
"Pt reports that his pain is 10/10. Patient states \"I'm leaving here at 0745 tomorrow but, I will be back in two days to get my pain controlled.\"  Patient demanding that pain team be paged for a new pain plan.    "

## 2022-01-29 DIAGNOSIS — Z71.89 OTHER SPECIFIED COUNSELING: ICD-10-CM

## 2022-01-30 ENCOUNTER — PATIENT OUTREACH (OUTPATIENT)
Dept: CARE COORDINATION | Facility: CLINIC | Age: 27
End: 2022-01-30
Payer: MEDICAID

## 2022-01-30 ENCOUNTER — HOSPITAL ENCOUNTER (EMERGENCY)
Facility: HOSPITAL | Age: 27
Discharge: HOME | End: 2022-01-30
Attending: EMERGENCY MEDICINE
Payer: MEDICAID

## 2022-01-30 VITALS
BODY MASS INDEX: 23.7 KG/M2 | DIASTOLIC BLOOD PRESSURE: 67 MMHG | SYSTOLIC BLOOD PRESSURE: 132 MMHG | WEIGHT: 175 LBS | OXYGEN SATURATION: 97 % | HEART RATE: 100 BPM | HEIGHT: 72 IN | TEMPERATURE: 98.2 F | RESPIRATION RATE: 20 BRPM

## 2022-01-30 RX ORDER — ONDANSETRON 2 MG/ML
4 INJECTION INTRAMUSCULAR; INTRAVENOUS EVERY 30 MIN PRN
Status: DISCONTINUED | OUTPATIENT
Start: 2022-01-30 | End: 2022-01-30

## 2022-01-30 RX ORDER — KETOROLAC TROMETHAMINE 30 MG/ML
15 INJECTION, SOLUTION INTRAMUSCULAR; INTRAVENOUS ONCE
Status: DISCONTINUED | OUTPATIENT
Start: 2022-01-30 | End: 2022-01-30

## 2022-01-30 ASSESSMENT — MIFFLIN-ST. JEOR: SCORE: 1816.79

## 2022-01-30 NOTE — PROGRESS NOTES
Clinic Care Coordination Contact    Background: Care Coordination referral placed from [facility] discharge report for reason of patient meeting criteria for a TCM outreach call by [facility] team.    Assessment: Upon chart review, [facility] Team member will cancel/close the referral for TCM outreach due to reason below:    Patient has presented to Emergency Department or has been readmitted to hospital    Plan: Care Coordination referral for TCM outreach canceled.    Beluah Martin  Community Health Worker  [facility]  [phone]

## 2022-01-30 NOTE — ED TRIAGE NOTES
He had 2 shots vodka last night at his hotel with his girl friend, states he is having pancreatic flair up. Pain a 12. He was just discharged from [facility], states he has had 10 visits. He states he wasn't given pain meds. He wants pain relief. He is in lobby talking on his cell phone.

## 2025-03-03 NOTE — PLAN OF CARE
Problem: Alcohol Withdrawal  Goal: Alcohol Withdrawal Symptom Control  1/24/2022 2225 by Filomena Rider, RN  Outcome: No Change  1/24/2022 1651 by Filomena Rider, RN  Outcome: No Change   Pt has been scoring around a 12 for the CIWA protocol.  Valium 10 mg given per order.  Rechecks done every 2 hours. Pt is very anxious, diaphoretic and itchy.    Pt has also been getting IV dilaudid every 3 hours for abdominal pain.  Pt has tolerated his clear liquid diet. No nausea. He reported he's hoping to have mashed potatoes and steak tomorrow.     HX OF HYPOSPADIUS REPAIR    THEN SAW UROLOGY RECENTLY DUE TO DYSURIA  - BUT KEE HAD TRACE BLOOD AT THAT POINT    ILL LAST WEEK WITH VIRAL SYNDROME    NOW WITH GROSSLY BLOODY URINE / CLOTTING     REC:  - RTC FOR AN EVALUATION IF FEVERS, FATIGUE, HEADACHES, OR HIS SXS PERSIST  - BACK TO UROLOGY - HEMATURIA CHANGES THE WAY THEY APPROACH HIS DYSURIA (STONES OR SLUDGE?)